# Patient Record
Sex: FEMALE | Race: WHITE | NOT HISPANIC OR LATINO | Employment: FULL TIME | URBAN - METROPOLITAN AREA
[De-identification: names, ages, dates, MRNs, and addresses within clinical notes are randomized per-mention and may not be internally consistent; named-entity substitution may affect disease eponyms.]

---

## 2017-05-01 RX ORDER — IBUPROFEN 600 MG/1
600 TABLET ORAL EVERY 6 HOURS PRN
Qty: 60 TABLET | Refills: 2 | Status: SHIPPED | OUTPATIENT
Start: 2017-05-01 | End: 2018-05-01

## 2017-05-01 NOTE — TELEPHONE ENCOUNTER
"----- Message from Maria Ines Valenzuela sent at 5/1/2017 12:32 PM CDT -----  Contact: [phone]  Patient is requesting to see the  Today for a "flare up from lupus".    "

## 2017-05-01 NOTE — TELEPHONE ENCOUNTER
Pt states that her period started and due to her lupus she is having a lot of cramping. Pt advised that Dr. Wiedemann can send in ibuprofen for her. Pt states that she has taken ibuprofen with no problems. Pt notified that if she is having a problem with lupus she needs to see her rheumatologist. Pt verbalized understanding

## 2017-08-29 ENCOUNTER — OFFICE VISIT (OUTPATIENT)
Dept: OBSTETRICS AND GYNECOLOGY | Facility: CLINIC | Age: 32
End: 2017-08-29
Payer: COMMERCIAL

## 2017-08-29 ENCOUNTER — TELEPHONE (OUTPATIENT)
Dept: OBSTETRICS AND GYNECOLOGY | Facility: CLINIC | Age: 32
End: 2017-08-29

## 2017-08-29 ENCOUNTER — LAB VISIT (OUTPATIENT)
Dept: LAB | Facility: HOSPITAL | Age: 32
End: 2017-08-29
Attending: OBSTETRICS & GYNECOLOGY
Payer: COMMERCIAL

## 2017-08-29 VITALS
HEIGHT: 67 IN | BODY MASS INDEX: 41.62 KG/M2 | DIASTOLIC BLOOD PRESSURE: 68 MMHG | SYSTOLIC BLOOD PRESSURE: 128 MMHG | WEIGHT: 265.19 LBS

## 2017-08-29 DIAGNOSIS — Z12.4 ROUTINE PAPANICOLAOU SMEAR: ICD-10-CM

## 2017-08-29 DIAGNOSIS — Z12.4 ROUTINE PAPANICOLAOU SMEAR: Primary | ICD-10-CM

## 2017-08-29 LAB
ALBUMIN SERPL BCP-MCNC: 3.4 G/DL
ALP SERPL-CCNC: 70 U/L
ALT SERPL W/O P-5'-P-CCNC: 6 U/L
ANION GAP SERPL CALC-SCNC: 8 MMOL/L
AST SERPL-CCNC: 11 U/L
BILIRUB SERPL-MCNC: 0.2 MG/DL
BUN SERPL-MCNC: 8 MG/DL
CALCIUM SERPL-MCNC: 9 MG/DL
CHLORIDE SERPL-SCNC: 106 MMOL/L
CO2 SERPL-SCNC: 25 MMOL/L
CREAT SERPL-MCNC: 0.9 MG/DL
EST. GFR  (AFRICAN AMERICAN): >60 ML/MIN/1.73 M^2
EST. GFR  (NON AFRICAN AMERICAN): >60 ML/MIN/1.73 M^2
GLUCOSE SERPL-MCNC: 86 MG/DL
POTASSIUM SERPL-SCNC: 3.8 MMOL/L
PROT SERPL-MCNC: 6.9 G/DL
SODIUM SERPL-SCNC: 139 MMOL/L
TSH SERPL DL<=0.005 MIU/L-ACNC: 1.08 UIU/ML

## 2017-08-29 PROCEDURE — 84443 ASSAY THYROID STIM HORMONE: CPT

## 2017-08-29 PROCEDURE — 80053 COMPREHEN METABOLIC PANEL: CPT

## 2017-08-29 PROCEDURE — 99395 PREV VISIT EST AGE 18-39: CPT | Mod: S$GLB,,, | Performed by: OBSTETRICS & GYNECOLOGY

## 2017-08-29 PROCEDURE — 99999 PR PBB SHADOW E&M-EST. PATIENT-LVL III: CPT | Mod: PBBFAC,,, | Performed by: OBSTETRICS & GYNECOLOGY

## 2017-08-29 PROCEDURE — 88175 CYTOPATH C/V AUTO FLUID REDO: CPT

## 2017-08-29 PROCEDURE — 36415 COLL VENOUS BLD VENIPUNCTURE: CPT

## 2017-08-29 RX ORDER — AMOXICILLIN 500 MG/1
CAPSULE ORAL
Refills: 0 | COMMUNITY
Start: 2017-07-25 | End: 2017-08-29

## 2017-08-29 RX ORDER — LEVONORGESTREL AND ETHINYL ESTRADIOL 0.15-0.03
1 KIT ORAL DAILY
Qty: 28 TABLET | Refills: 3 | Status: SHIPPED | OUTPATIENT
Start: 2017-08-29 | End: 2022-11-28

## 2017-08-29 RX ORDER — ALPRAZOLAM 1 MG/1
TABLET ORAL
Refills: 1 | COMMUNITY
Start: 2017-08-26 | End: 2020-09-01 | Stop reason: CLARIF

## 2017-08-29 RX ORDER — PHENTERMINE HYDROCHLORIDE 37.5 MG/1
37.5 TABLET ORAL DAILY
Refills: 1 | COMMUNITY
Start: 2017-07-25 | End: 2017-08-29

## 2017-08-29 RX ORDER — CEFUROXIME AXETIL 500 MG/1
TABLET ORAL
Refills: 0 | COMMUNITY
Start: 2017-07-27 | End: 2017-08-29

## 2017-08-29 NOTE — TELEPHONE ENCOUNTER
Tamia, her labs were fine, want her to take the birth control like we discussed, after the second pack, when on 3rd, call us to set up a ultrasound and fu, thanks

## 2017-08-29 NOTE — PROGRESS NOTES
"HPI:   32 y.o.   OB History      Para Term  AB Living    1       1      SAB TAB Ectopic Multiple Live Births    1               Patient's last menstrual period was 2017.    Patient is  here for her annual gynecologic exam.  She has no complaints.     ROS:  GENERAL: No fever, chills, fatigability or weight loss.  SKIN: No rashes, itching or changes in color or texture of skin.  HEAD: No headaches or recent head trauma.  EYES: Visual acuity fine. No photophobia, ocular pain or diplopia.  EARS: Denies ear pain, discharge or vertigo.  NOSE: No loss of smell, no epistaxis or postnasal drip.  MOUTH & THROAT: No hoarseness or change in voice. No excessive gum bleeding.  NODES: Denies swollen glands.  CHEST: Denies ANNE, cyanosis, wheezing, cough and sputum production.  CARDIOVASCULAR: Denies chest pain, PND, orthopnea or reduced exercise tolerance.  ABDOMEN: Appetite fine. No weight loss. Denies diarrhea, abdominal pain, hematemesis or blood in stool.  URINARY: No flank pain, dysuria or hematuria.  PERIPHERAL VASCULAR: No claudication or cyanosis.  MUSCULOSKELETAL: No joint stiffness or swelling. Denies back pain.  NEUROLOGIC: No history of seizures, paralysis, alteration of gait or coordination.    PE:   /68   Ht 5' 7" (1.702 m)   Wt 120.3 kg (265 lb 3.4 oz)   LMP 2017   BMI 41.54 kg/m²   APPEARANCE: Well nourished, well developed, in no acute distress.  NECK: Neck symmetric without masses or thyromegaly.  BREASTS: Symmetrical, no skin changes or visible lesions. No palpable masses, nipple discharge or adenopathy bilaterally.  ABDOMEN: Flat. Soft. No tenderness or masses. No hepatosplenomegaly. No hernias. No CVA tenderness.  VULVA: No lesions. Normal female genitalia.  URETHRAL MEATUS: Normal size and location, no lesions, no prolapse.  URETHRA: No masses, tenderness, prolapse or scarring.  VAGINA: Moist and well rugated, no discharge, no significant cystocele or rectocele.  CERVIX: No " lesions and discharge. PAP done.  UTERUS: Normal size, regular shape, mobile, non-tender, bladder base nontender.  ADNEXA: No masses, tenderness or CDS nodularity.  ANUS PERINEUM: Normal.    PROCEDURES:  Pap smear    Assessment:  Normal Gynecologic Exam    Plan:  Mammogram and Colonoscopy if indicated by current recommendations.  Return to clinic in one year or for any problems or complaints.  No ocp, cycles hx of heavy and cramps, has lupus, irreg , been no steroids  Pain with cycles  Discussed ocp, vs scope  Will need labs, then us,   Dr jen castillo  5  Yrs, endo mild?  Nothing major  Plan, ocp for 2+ cycles, labs, then us when on 2nbd cycle

## 2017-09-05 NOTE — TELEPHONE ENCOUNTER
Informed patient of results. Patient voiced understanding. Notified patient to call office if there were any further questions.   Patient will call to schedule ultrasound and f/u.

## 2017-09-19 ENCOUNTER — TELEPHONE (OUTPATIENT)
Dept: OBSTETRICS AND GYNECOLOGY | Facility: CLINIC | Age: 32
End: 2017-09-19

## 2017-09-19 NOTE — TELEPHONE ENCOUNTER
Pt states she started her period again while taking the birth control. She started taking the birth control on 8/30/17. Pt states her cycle is heavy and she is in a lot of pain. She shouldn't have her cycle until next week.

## 2017-09-19 NOTE — TELEPHONE ENCOUNTER
----- Message from Tona Forbes sent at 9/19/2017  2:48 PM CDT -----  Contact: Self [phone]  Calling to talk to nurse concerning her birth control. Please advice

## 2017-09-20 NOTE — TELEPHONE ENCOUNTER
Stop the pills for this week, allow cycle, and restart a new pack Sunday  This is very common the first 1-3 months of starting a new pill,

## 2020-11-18 ENCOUNTER — OCCUPATIONAL HEALTH (OUTPATIENT)
Dept: URGENT CARE | Facility: CLINIC | Age: 35
End: 2020-11-18

## 2020-11-18 DIAGNOSIS — Z11.9 ENCOUNTER FOR SCREENING EXAMINATION FOR INFECTIOUS DISEASE: Primary | ICD-10-CM

## 2020-11-18 LAB
CTP QC/QA: YES
SARS-COV-2 RDRP RESP QL NAA+PROBE: NEGATIVE

## 2020-11-18 PROCEDURE — 99199 CV19 SPECIMEN HANDLING FEE / SWAB: ICD-10-PCS | Mod: S$GLB,,, | Performed by: PHYSICIAN ASSISTANT

## 2020-11-18 PROCEDURE — 99199 UNLISTED SPECIAL SVC PX/RPRT: CPT | Mod: S$GLB,,, | Performed by: PHYSICIAN ASSISTANT

## 2020-11-18 PROCEDURE — U0002: ICD-10-PCS | Mod: QW,S$GLB,, | Performed by: PHYSICIAN ASSISTANT

## 2020-11-18 PROCEDURE — U0002 COVID-19 LAB TEST NON-CDC: HCPCS | Mod: QW,S$GLB,, | Performed by: PHYSICIAN ASSISTANT

## 2021-01-08 ENCOUNTER — OCCUPATIONAL HEALTH (OUTPATIENT)
Dept: URGENT CARE | Facility: CLINIC | Age: 36
End: 2021-01-08
Payer: MEDICAID

## 2021-01-08 VITALS — TEMPERATURE: 97 F

## 2021-01-08 DIAGNOSIS — U07.1 COVID-19: Primary | ICD-10-CM

## 2021-01-08 LAB
CTP QC/QA: YES
SARS-COV-2 RDRP RESP QL NAA+PROBE: NEGATIVE

## 2021-05-22 PROCEDURE — 99284 EMERGENCY DEPT VISIT MOD MDM: CPT | Mod: 25

## 2021-05-22 PROCEDURE — 96374 THER/PROPH/DIAG INJ IV PUSH: CPT

## 2021-05-22 PROCEDURE — 96375 TX/PRO/DX INJ NEW DRUG ADDON: CPT

## 2021-05-23 ENCOUNTER — HOSPITAL ENCOUNTER (EMERGENCY)
Facility: HOSPITAL | Age: 36
Discharge: HOME | End: 2021-05-23
Attending: EMERGENCY MEDICINE
Payer: MEDICAID

## 2021-05-23 VITALS
HEART RATE: 68 BPM | TEMPERATURE: 98 F | WEIGHT: 230 LBS | SYSTOLIC BLOOD PRESSURE: 128 MMHG | DIASTOLIC BLOOD PRESSURE: 70 MMHG | BODY MASS INDEX: 36.02 KG/M2 | RESPIRATION RATE: 18 BRPM | OXYGEN SATURATION: 100 %

## 2021-05-23 DIAGNOSIS — G43.819 OTHER MIGRAINE WITHOUT STATUS MIGRAINOSUS, INTRACTABLE: Primary | ICD-10-CM

## 2021-05-23 PROCEDURE — 63600175 PHARM REV CODE 636 W HCPCS: Performed by: EMERGENCY MEDICINE

## 2021-05-23 PROCEDURE — 25000003 PHARM REV CODE 250: Performed by: EMERGENCY MEDICINE

## 2021-05-23 RX ORDER — PROCHLORPERAZINE EDISYLATE 5 MG/ML
10 INJECTION INTRAMUSCULAR; INTRAVENOUS
Status: COMPLETED | OUTPATIENT
Start: 2021-05-23 | End: 2021-05-23

## 2021-05-23 RX ORDER — ACETAMINOPHEN 10 MG/ML
1000 INJECTION, SOLUTION INTRAVENOUS
Status: DISCONTINUED | OUTPATIENT
Start: 2021-05-23 | End: 2021-05-23 | Stop reason: HOSPADM

## 2021-05-23 RX ORDER — METOCLOPRAMIDE HYDROCHLORIDE 5 MG/ML
10 INJECTION INTRAMUSCULAR; INTRAVENOUS
Status: COMPLETED | OUTPATIENT
Start: 2021-05-23 | End: 2021-05-23

## 2021-05-23 RX ADMIN — SODIUM CHLORIDE 1000 ML: 0.9 INJECTION, SOLUTION INTRAVENOUS at 01:05

## 2021-05-23 RX ADMIN — PROCHLORPERAZINE EDISYLATE 10 MG: 5 INJECTION INTRAMUSCULAR; INTRAVENOUS at 01:05

## 2021-05-23 RX ADMIN — METOCLOPRAMIDE 10 MG: 5 INJECTION, SOLUTION INTRAMUSCULAR; INTRAVENOUS at 01:05

## 2021-11-03 ENCOUNTER — OFFICE VISIT (OUTPATIENT)
Dept: URGENT CARE | Facility: CLINIC | Age: 36
End: 2021-11-03
Payer: MEDICAID

## 2021-11-03 VITALS
BODY MASS INDEX: 37.67 KG/M2 | OXYGEN SATURATION: 98 % | DIASTOLIC BLOOD PRESSURE: 83 MMHG | TEMPERATURE: 98 F | SYSTOLIC BLOOD PRESSURE: 138 MMHG | WEIGHT: 240 LBS | HEIGHT: 67 IN | HEART RATE: 85 BPM

## 2021-11-03 DIAGNOSIS — J02.9 SORE THROAT: Primary | ICD-10-CM

## 2021-11-03 DIAGNOSIS — J02.0 STREP PHARYNGITIS: ICD-10-CM

## 2021-11-03 DIAGNOSIS — H60.501 ACUTE OTITIS EXTERNA OF RIGHT EAR, UNSPECIFIED TYPE: ICD-10-CM

## 2021-11-03 LAB
CTP QC/QA: YES
MOLECULAR STREP A: POSITIVE

## 2021-11-03 PROCEDURE — 87651 STREP A DNA AMP PROBE: CPT | Mod: QW,S$GLB,,

## 2021-11-03 PROCEDURE — 99203 OFFICE O/P NEW LOW 30 MIN: CPT | Mod: S$GLB,,,

## 2021-11-03 PROCEDURE — 87651 POCT STREP A MOLECULAR: ICD-10-PCS | Mod: QW,S$GLB,,

## 2021-11-03 PROCEDURE — 99203 PR OFFICE/OUTPT VISIT, NEW, LEVL III, 30-44 MIN: ICD-10-PCS | Mod: S$GLB,,,

## 2021-11-03 RX ORDER — AMOXICILLIN AND CLAVULANATE POTASSIUM 875; 125 MG/1; MG/1
1 TABLET, FILM COATED ORAL EVERY 12 HOURS
Qty: 14 TABLET | Refills: 0 | Status: SHIPPED | OUTPATIENT
Start: 2021-11-03 | End: 2021-11-10

## 2021-11-03 RX ORDER — PREDNISONE 20 MG/1
20 TABLET ORAL DAILY
Qty: 3 TABLET | Refills: 0 | Status: SHIPPED | OUTPATIENT
Start: 2021-11-03 | End: 2021-11-06

## 2021-11-03 RX ORDER — OFLOXACIN 3 MG/ML
10 SOLUTION AURICULAR (OTIC) DAILY
Qty: 5 ML | Refills: 0 | Status: SHIPPED | OUTPATIENT
Start: 2021-11-03 | End: 2021-11-10

## 2022-09-27 ENCOUNTER — OFFICE VISIT (OUTPATIENT)
Dept: OBSTETRICS AND GYNECOLOGY | Facility: CLINIC | Age: 37
End: 2022-09-27
Payer: MEDICAID

## 2022-09-27 VITALS
DIASTOLIC BLOOD PRESSURE: 72 MMHG | SYSTOLIC BLOOD PRESSURE: 132 MMHG | BODY MASS INDEX: 39.64 KG/M2 | WEIGHT: 253.06 LBS

## 2022-09-27 DIAGNOSIS — Z01.419 WELL WOMAN EXAM WITH ROUTINE GYNECOLOGICAL EXAM: Primary | ICD-10-CM

## 2022-09-27 PROCEDURE — 1159F MED LIST DOCD IN RCRD: CPT | Mod: CPTII,,, | Performed by: OBSTETRICS & GYNECOLOGY

## 2022-09-27 PROCEDURE — 3075F SYST BP GE 130 - 139MM HG: CPT | Mod: CPTII,,, | Performed by: OBSTETRICS & GYNECOLOGY

## 2022-09-27 PROCEDURE — 99999 PR PBB SHADOW E&M-EST. PATIENT-LVL II: CPT | Mod: PBBFAC,,, | Performed by: OBSTETRICS & GYNECOLOGY

## 2022-09-27 PROCEDURE — 3078F DIAST BP <80 MM HG: CPT | Mod: CPTII,,, | Performed by: OBSTETRICS & GYNECOLOGY

## 2022-09-27 PROCEDURE — 1160F RVW MEDS BY RX/DR IN RCRD: CPT | Mod: CPTII,,, | Performed by: OBSTETRICS & GYNECOLOGY

## 2022-09-27 PROCEDURE — 3075F PR MOST RECENT SYSTOLIC BLOOD PRESS GE 130-139MM HG: ICD-10-PCS | Mod: CPTII,,, | Performed by: OBSTETRICS & GYNECOLOGY

## 2022-09-27 PROCEDURE — 1159F PR MEDICATION LIST DOCUMENTED IN MEDICAL RECORD: ICD-10-PCS | Mod: CPTII,,, | Performed by: OBSTETRICS & GYNECOLOGY

## 2022-09-27 PROCEDURE — 3008F PR BODY MASS INDEX (BMI) DOCUMENTED: ICD-10-PCS | Mod: CPTII,,, | Performed by: OBSTETRICS & GYNECOLOGY

## 2022-09-27 PROCEDURE — 1160F PR REVIEW ALL MEDS BY PRESCRIBER/CLIN PHARMACIST DOCUMENTED: ICD-10-PCS | Mod: CPTII,,, | Performed by: OBSTETRICS & GYNECOLOGY

## 2022-09-27 PROCEDURE — 99385 PREV VISIT NEW AGE 18-39: CPT | Mod: S$PBB,,, | Performed by: OBSTETRICS & GYNECOLOGY

## 2022-09-27 PROCEDURE — 88175 CYTOPATH C/V AUTO FLUID REDO: CPT | Performed by: OBSTETRICS & GYNECOLOGY

## 2022-09-27 PROCEDURE — 99385 PR PREVENTIVE VISIT,NEW,18-39: ICD-10-PCS | Mod: S$PBB,,, | Performed by: OBSTETRICS & GYNECOLOGY

## 2022-09-27 PROCEDURE — 3078F PR MOST RECENT DIASTOLIC BLOOD PRESSURE < 80 MM HG: ICD-10-PCS | Mod: CPTII,,, | Performed by: OBSTETRICS & GYNECOLOGY

## 2022-09-27 PROCEDURE — 99999 PR PBB SHADOW E&M-EST. PATIENT-LVL II: ICD-10-PCS | Mod: PBBFAC,,, | Performed by: OBSTETRICS & GYNECOLOGY

## 2022-09-27 PROCEDURE — 3008F BODY MASS INDEX DOCD: CPT | Mod: CPTII,,, | Performed by: OBSTETRICS & GYNECOLOGY

## 2022-09-27 PROCEDURE — 99212 OFFICE O/P EST SF 10 MIN: CPT | Mod: PBBFAC,PO | Performed by: OBSTETRICS & GYNECOLOGY

## 2022-09-27 NOTE — PROGRESS NOTES
HPI:   37 y.o.   OB History          1    Para        Term                AB   1    Living             SAB   1    IAB        Ectopic        Multiple        Live Births                  Patient's last menstrual period was 2022 (exact date).    Patient is  here for her annual gynecologic exam.  She has no complaints.     ROS:  GENERAL: No fever, chills, fatigability or weight loss.  SKIN: No rashes, itching or changes in color or texture of skin.  HEAD: No headaches or recent head trauma.  EYES: Visual acuity fine. No photophobia, ocular pain or diplopia.  EARS: Denies ear pain, discharge or vertigo.  NOSE: No loss of smell, no epistaxis or postnasal drip.  MOUTH & THROAT: No hoarseness or change in voice. No excessive gum bleeding.  NODES: Denies swollen glands.  CHEST: Denies ANNE, cyanosis, wheezing, cough and sputum production.  CARDIOVASCULAR: Denies chest pain, PND, orthopnea or reduced exercise tolerance.  ABDOMEN: Appetite fine. No weight loss. Denies diarrhea, abdominal pain, hematemesis or blood in stool.  URINARY: No flank pain, dysuria or hematuria.  PERIPHERAL VASCULAR: No claudication or cyanosis.  MUSCULOSKELETAL: No joint stiffness or swelling. Denies back pain.  NEUROLOGIC: No history of seizures, paralysis, alteration of gait or coordination.    PE:   /72   Wt 114.8 kg (253 lb 1.4 oz)   LMP 2022 (Exact Date)   BMI 39.64 kg/m²   APPEARANCE: Well nourished, well developed, in no acute distress.  NECK: Neck symmetric without masses or thyromegaly.  BREASTS: Symmetrical, no skin changes or visible lesions. No palpable masses, nipple discharge or adenopathy bilaterally.  ABDOMEN: Flat. Soft. No tenderness or masses. No hepatosplenomegaly. No hernias. No CVA tenderness.  VULVA: No lesions. Normal female genitalia.  URETHRAL MEATUS: Normal size and location, no lesions, no prolapse.  URETHRA: No masses, tenderness, prolapse or scarring.  VAGINA: Moist and well rugated, no  discharge, no significant cystocele or rectocele.  CERVIX: No lesions and discharge. PAP done.  UTERUS: Normal size, regular shape, mobile, non-tender, bladder base nontender.  ADNEXA: No masses, tenderness or CDS nodularity.  ANUS PERINEUM: Normal.    PROCEDURES:  Pap smear    Assessment:  Normal Gynecologic Exam    Plan:  Mammogram and Colonoscopy if indicated by current recommendations.  Return to clinic in one year or for any problems or complaints.    Infertility  Cycles irreg   getting sa  Lmk  Will rev chart thereafter

## 2022-09-30 LAB
FINAL PATHOLOGIC DIAGNOSIS: NORMAL
Lab: NORMAL

## 2022-10-06 PROBLEM — R06.02 SOB (SHORTNESS OF BREATH): Status: ACTIVE | Noted: 2022-10-06

## 2022-10-12 ENCOUNTER — HOSPITAL ENCOUNTER (EMERGENCY)
Facility: HOSPITAL | Age: 37
Discharge: HOME | End: 2022-10-12
Attending: EMERGENCY MEDICINE
Payer: MEDICAID

## 2022-10-12 VITALS
BODY MASS INDEX: 39.16 KG/M2 | OXYGEN SATURATION: 96 % | SYSTOLIC BLOOD PRESSURE: 122 MMHG | HEART RATE: 74 BPM | TEMPERATURE: 98 F | DIASTOLIC BLOOD PRESSURE: 82 MMHG | WEIGHT: 250 LBS | RESPIRATION RATE: 13 BRPM

## 2022-10-12 DIAGNOSIS — R06.02 SOB (SHORTNESS OF BREATH): ICD-10-CM

## 2022-10-12 DIAGNOSIS — R09.81 NASAL CONGESTION: ICD-10-CM

## 2022-10-12 DIAGNOSIS — J18.9 COMMUNITY ACQUIRED PNEUMONIA OF LEFT LOWER LOBE OF LUNG: ICD-10-CM

## 2022-10-12 DIAGNOSIS — R11.10 VOMITING: Primary | ICD-10-CM

## 2022-10-12 DIAGNOSIS — R09.89 CHEST CONGESTION: ICD-10-CM

## 2022-10-12 LAB
ALBUMIN SERPL BCP-MCNC: 3.6 G/DL (ref 3.5–5.2)
ALP SERPL-CCNC: 48 U/L (ref 55–135)
ALT SERPL W/O P-5'-P-CCNC: 14 U/L (ref 10–44)
ANION GAP SERPL CALC-SCNC: 11 MMOL/L (ref 8–16)
AST SERPL-CCNC: 12 U/L (ref 10–40)
B-HCG UR QL: NEGATIVE
BASOPHILS # BLD AUTO: 0.06 K/UL (ref 0–0.2)
BASOPHILS NFR BLD: 0.7 % (ref 0–1.9)
BILIRUB SERPL-MCNC: 0.2 MG/DL (ref 0.1–1)
BILIRUB UR QL STRIP: NEGATIVE
BUN SERPL-MCNC: 9 MG/DL (ref 6–20)
CALCIUM SERPL-MCNC: 9.5 MG/DL (ref 8.7–10.5)
CHLORIDE SERPL-SCNC: 110 MMOL/L (ref 95–110)
CLARITY UR: CLEAR
CO2 SERPL-SCNC: 21 MMOL/L (ref 23–29)
COLOR UR: COLORLESS
CREAT SERPL-MCNC: 0.7 MG/DL (ref 0.5–1.4)
CTP QC/QA: YES
DIFFERENTIAL METHOD: ABNORMAL
EOSINOPHIL # BLD AUTO: 0.2 K/UL (ref 0–0.5)
EOSINOPHIL NFR BLD: 2.3 % (ref 0–8)
ERYTHROCYTE [DISTWIDTH] IN BLOOD BY AUTOMATED COUNT: 13.5 % (ref 11.5–14.5)
EST. GFR  (NO RACE VARIABLE): >60 ML/MIN/1.73 M^2
GLUCOSE SERPL-MCNC: 128 MG/DL (ref 70–110)
GLUCOSE UR QL STRIP: NEGATIVE
HCT VFR BLD AUTO: 39.4 % (ref 37–48.5)
HGB BLD-MCNC: 13.4 G/DL (ref 12–16)
HGB UR QL STRIP: NEGATIVE
IMM GRANULOCYTES # BLD AUTO: 0.03 K/UL (ref 0–0.04)
IMM GRANULOCYTES NFR BLD AUTO: 0.3 % (ref 0–0.5)
KETONES UR QL STRIP: NEGATIVE
LEUKOCYTE ESTERASE UR QL STRIP: NEGATIVE
LIPASE SERPL-CCNC: 28 U/L (ref 4–60)
LYMPHOCYTES # BLD AUTO: 2.4 K/UL (ref 1–4.8)
LYMPHOCYTES NFR BLD: 27.6 % (ref 18–48)
MCH RBC QN AUTO: 31.2 PG (ref 27–31)
MCHC RBC AUTO-ENTMCNC: 34 G/DL (ref 32–36)
MCV RBC AUTO: 92 FL (ref 82–98)
MONOCYTES # BLD AUTO: 0.5 K/UL (ref 0.3–1)
MONOCYTES NFR BLD: 5.9 % (ref 4–15)
NEUTROPHILS # BLD AUTO: 5.4 K/UL (ref 1.8–7.7)
NEUTROPHILS NFR BLD: 63.2 % (ref 38–73)
NITRITE UR QL STRIP: NEGATIVE
NRBC BLD-RTO: 0 /100 WBC
PH UR STRIP: 7 [PH] (ref 5–8)
PLATELET # BLD AUTO: 306 K/UL (ref 150–450)
PMV BLD AUTO: 9.4 FL (ref 9.2–12.9)
POTASSIUM SERPL-SCNC: 4 MMOL/L (ref 3.5–5.1)
PROT SERPL-MCNC: 6.8 G/DL (ref 6–8.4)
PROT UR QL STRIP: NEGATIVE
RBC # BLD AUTO: 4.3 M/UL (ref 4–5.4)
SODIUM SERPL-SCNC: 142 MMOL/L (ref 136–145)
SP GR UR STRIP: 1.01 (ref 1–1.03)
URN SPEC COLLECT METH UR: ABNORMAL
UROBILINOGEN UR STRIP-ACNC: NEGATIVE EU/DL
WBC # BLD AUTO: 8.59 K/UL (ref 3.9–12.7)

## 2022-10-12 PROCEDURE — 83690 ASSAY OF LIPASE: CPT | Performed by: EMERGENCY MEDICINE

## 2022-10-12 PROCEDURE — 81025 URINE PREGNANCY TEST: CPT | Performed by: EMERGENCY MEDICINE

## 2022-10-12 PROCEDURE — 96374 THER/PROPH/DIAG INJ IV PUSH: CPT

## 2022-10-12 PROCEDURE — 81003 URINALYSIS AUTO W/O SCOPE: CPT | Performed by: EMERGENCY MEDICINE

## 2022-10-12 PROCEDURE — 96361 HYDRATE IV INFUSION ADD-ON: CPT

## 2022-10-12 PROCEDURE — 93005 ELECTROCARDIOGRAM TRACING: CPT

## 2022-10-12 PROCEDURE — 25000003 PHARM REV CODE 250: Performed by: EMERGENCY MEDICINE

## 2022-10-12 PROCEDURE — 85025 COMPLETE CBC W/AUTO DIFF WBC: CPT | Performed by: EMERGENCY MEDICINE

## 2022-10-12 PROCEDURE — 99285 EMERGENCY DEPT VISIT HI MDM: CPT | Mod: 25

## 2022-10-12 PROCEDURE — 93010 EKG 12-LEAD: ICD-10-PCS | Mod: ,,, | Performed by: INTERNAL MEDICINE

## 2022-10-12 PROCEDURE — 63600175 PHARM REV CODE 636 W HCPCS: Performed by: EMERGENCY MEDICINE

## 2022-10-12 PROCEDURE — 80053 COMPREHEN METABOLIC PANEL: CPT | Performed by: EMERGENCY MEDICINE

## 2022-10-12 PROCEDURE — 93010 ELECTROCARDIOGRAM REPORT: CPT | Mod: ,,, | Performed by: INTERNAL MEDICINE

## 2022-10-12 RX ORDER — ONDANSETRON 2 MG/ML
4 INJECTION INTRAMUSCULAR; INTRAVENOUS
Status: COMPLETED | OUTPATIENT
Start: 2022-10-12 | End: 2022-10-12

## 2022-10-12 RX ORDER — DOXYCYCLINE 100 MG/1
100 CAPSULE ORAL 2 TIMES DAILY
Qty: 10 CAPSULE | Refills: 0 | Status: SHIPPED | OUTPATIENT
Start: 2022-10-12 | End: 2022-10-17

## 2022-10-12 RX ORDER — ONDANSETRON 4 MG/1
4 TABLET, FILM COATED ORAL EVERY 6 HOURS
Qty: 12 TABLET | Refills: 0 | Status: SHIPPED | OUTPATIENT
Start: 2022-10-12 | End: 2022-11-28

## 2022-10-12 RX ORDER — ONDANSETRON 4 MG/1
4 TABLET, FILM COATED ORAL EVERY 6 HOURS
Qty: 12 TABLET | Refills: 0 | Status: SHIPPED | OUTPATIENT
Start: 2022-10-12 | End: 2022-10-12 | Stop reason: SDUPTHER

## 2022-10-12 RX ADMIN — SODIUM CHLORIDE 1000 ML: 0.9 INJECTION, SOLUTION INTRAVENOUS at 02:10

## 2022-10-12 RX ADMIN — ONDANSETRON 4 MG: 2 INJECTION INTRAMUSCULAR; INTRAVENOUS at 02:10

## 2022-10-12 NOTE — ED PROVIDER NOTES
Call Reason: scheduling appointment  Visit Type: Consult  When appointment should be scheduled:  Next available  Provider: Epilepsy provider  Appointment Note:     Encounter Date: 10/12/2022       History     Chief Complaint   Patient presents with    Chest Congestion     Pt with history of lupus has had chest and nasal congestion for about 3 months. Pt also has weakness, fever , nausea, vomiting and diarrhea. Pt has seen Pulmonology and urgent care and has been sent to ER for further eval .      37-year-old female with history of lupus (not on medication), asthma, migraine, presents to the ED for 3 months of congestion.  Patient reports that she had nasal and chest congestion for the last 3 months, has been seen by pulmonologist and multiple visits to urgent care without improvement.  Patient reports to have increased ear pressure, nausea, vomiting, diarrhea, and fever for the last 3 days.  States that her cough getting worse, now she saw some blood streaks from cough.  Patient has been you multiple OTC medications without help, has been using albuterol and Flovent for asthma.  Patient quit smoking for 1 week now.  Patient lost follow-up with her rheumatologist due to insurance issue.  She stopped taking her Plaquenil because of the side effects. No chest pain, shortness of breath, or abdominal pain.       Review of patient's allergies indicates:   Allergen Reactions    Toradol [ketorolac] Swelling     Past Medical History:   Diagnosis Date    Asthma     Lupus     Migraine headache      Past Surgical History:   Procedure Laterality Date    TYMPANOSTOMY TUBE PLACEMENT       Family History   Problem Relation Age of Onset    Hypertension Mother     Hypertension Father     Hyperlipidemia Father     Heart disease Father     Diabetes Mellitus Father      Social History     Tobacco Use    Smoking status: Some Days     Types: Cigarettes     Last attempt to quit: 2017     Years since quittin.1    Smokeless tobacco: Never   Substance Use Topics    Alcohol use: No    Drug use: No     Review of Systems   Constitutional:  Negative for chills and fever.   HENT:  Positive for  congestion. Negative for sore throat.    Eyes:  Negative for pain and visual disturbance.   Respiratory:  Positive for wheezing. Negative for cough and chest tightness.    Cardiovascular:  Negative for chest pain and palpitations.   Gastrointestinal:  Positive for diarrhea, nausea and vomiting. Negative for abdominal pain.   Genitourinary:  Negative for dysuria and flank pain.   Musculoskeletal:  Negative for back pain and neck pain.   Skin:  Positive for rash.   Neurological:  Negative for numbness and headaches.     Physical Exam     Initial Vitals [10/12/22 1303]   BP Pulse Resp Temp SpO2   132/86 100 20 98.3 °F (36.8 °C) 98 %      MAP       --         Physical Exam    Nursing note and vitals reviewed.  Constitutional: She appears well-developed. No distress.   HENT:   Head: Normocephalic and atraumatic.   Mouth/Throat: Oropharynx is clear and moist.   Eyes: Conjunctivae and EOM are normal. Pupils are equal, round, and reactive to light.   Neck: No JVD present.   Normal range of motion.  Cardiovascular:  Normal rate, regular rhythm, normal heart sounds and intact distal pulses.           Pulmonary/Chest: Breath sounds normal. No respiratory distress.   Abdominal: Abdomen is soft. She exhibits no distension. There is no abdominal tenderness.   Musculoskeletal:         General: No tenderness or edema. Normal range of motion.      Cervical back: Normal range of motion.     Neurological: She is alert and oriented to person, place, and time. She has normal strength.   Skin: Skin is warm and dry. Capillary refill takes less than 2 seconds. Rash noted.   Erythema rash on her chest.   No malar rash, or discoid        ED Course   Procedures  Labs Reviewed   CBC W/ AUTO DIFFERENTIAL - Abnormal; Notable for the following components:       Result Value    MCH 31.2 (*)     All other components within normal limits   COMPREHENSIVE METABOLIC PANEL - Abnormal; Notable for the following components:    CO2 21 (*)     Glucose 128  (*)     Alkaline Phosphatase 48 (*)     All other components within normal limits   URINALYSIS, REFLEX TO URINE CULTURE - Abnormal; Notable for the following components:    Color, UA Colorless (*)     All other components within normal limits    Narrative:     Specimen Source->Urine   LIPASE   POCT URINE PREGNANCY     EKG Readings: (Independently Interpreted)   Initial Reading: No STEMI. Previous EKG: Compared with most recent EKG Rhythm: Normal Sinus Rhythm. Heart Rate: 75. Ectopy: No Ectopy. Conduction: Normal. ST Segments: Normal ST Segments. T Waves: Normal. Axis: Normal. Clinical Impression: Normal Sinus Rhythm   ECG Results              EKG 12-lead (In process)  Result time 10/12/22 15:11:30      In process by Interface, Lab In [facility] (10/12/22 15:11:30)                   Narrative:    Test Reason : R11.10,    Vent. Rate : 075 BPM     Atrial Rate : 075 BPM     P-R Int : 178 ms          QRS Dur : 076 ms      QT Int : 376 ms       P-R-T Axes : 041 082 054 degrees     QTc Int : 419 ms    Normal sinus rhythm  Normal ECG  No previous ECGs available    Referred By: [email or web address]   SELF           Confirmed By:                                   Imaging Results              X-Ray Chest PA And Lateral (Final result)  Result time 10/12/22 14:47:35      Final result by Alonso Croft MD (10/12/22 14:47:35)                   Impression:      1. Increased parenchymal attenuation projected over the left lower lung zone, suggests atelectasis however developing consolidation is a consideration, correlation is needed.      Electronically signed by: Alonso Croft MD  Date:    10/12/2022  Time:    14:47               Narrative:    EXAMINATION:  XR CHEST PA AND LATERAL    CLINICAL HISTORY:  Other specified symptoms and signs involving the circulatory and respiratory systems    TECHNIQUE:  PA and lateral views of the chest were performed.    COMPARISON:  08/31/2022    FINDINGS:  The cardiomediastinal silhouette is not enlarged.   There is no pleural effusion.  The trachea is midline.  The lungs are symmetrically expanded bilaterally with bandlike atelectasis projected over the left lower lung zone..  No large focal consolidation seen.  There is no pneumothorax.  The osseous structures are remarkable for degenerative change/remote traumatic change of the left acromioclavicular joint..                                    X-Rays:   Independently Interpreted Readings:   Chest X-Ray: Normal heart size. There is an infiltrate in the LLL.   Medications   ondansetron injection 4 mg (4 mg Intravenous Given 10/12/22 1415)   sodium chloride 0.9% bolus 1,000 mL (0 mLs Intravenous Stopped 10/12/22 1516)     Medical Decision Making:   History:   Old Medical Records: I decided to obtain old medical records.  Initial Assessment:   37-year-old female with history of lupus (not on medication), asthma, migraine, presents to the ED for 3 months of congestion, recent nausea, vomiting, and diarrhea. Patient is well appearing, no fever, bilateral wheezing on exam, no respiratory distress.   Differential Diagnosis:   Pneumonia, gastroenteritis, pancreatitis, lupus flare, UTI, asthma exacerbation.   Clinical Tests:   Lab Tests: Ordered and Reviewed  Radiological Study: Ordered and Reviewed  Medical Tests: Ordered and Reviewed           ED Course as of 10/12/22 1718   Wed Oct 12, 2022   1652 WBC: 8.59 [NC]   1714 Hemoglobin: 13.4 [NC]   1714 Platelets: 306 [NC]   1714 Sodium: 142 [NC]   1714 Potassium: 4.0 [NC]   1714 BUN: 9 [NC]   1714 Creatinine: 0.7 [NC]   1714 Lipase: 28 [NC]   1714 Urinalysis, Reflex to Urine Culture Urine, Clean Catch(!)  No UTI [NC]   1714 Patient is currently quitting smoking, on Flovent for her asthma, has steroid a month ago.  Doubt to have asthma attack this point.  Her chest x-ray concerning for consolidation over the lower lobe, will treat her as community-acquired pneumonia.  Will refer to Rheumatology for glucose follow-up, and ENT for  her chronic nasal congestion.  Prescribed Zofran, doxycycline.  Recommended to follow-up with her primary care provider within 1 week, provided discharge instruction and return to the ED function.  No other question.  He [NC]      ED Course User Index  [NC] Guy Ramos MD                 Clinical Impression:   Final diagnoses:  [R11.10] Vomiting (Primary)  [R09.89] Chest congestion  [J18.9] Community acquired pneumonia of left lower lobe of lung  [R09.81] Nasal congestion  [R06.02] SOB (shortness of breath)        ED Disposition Condition    Discharge Stable          ED Prescriptions       Medication Sig Dispense Start Date End Date Auth. Provider    doxycycline (VIBRAMYCIN) 100 MG Cap Take 1 capsule (100 mg total) by mouth 2 (two) times daily. for 5 days 10 capsule 10/12/2022 10/17/2022 Guy Ramos MD    ondansetron (ZOFRAN) 4 MG tablet  (Status: Discontinued) Take 1 tablet (4 mg total) by mouth every 6 (six) hours. 12 tablet 10/12/2022 10/12/2022 Guy Ramos MD    ondansetron (ZOFRAN) 4 MG tablet Take 1 tablet (4 mg total) by mouth every 6 (six) hours. 12 tablet 10/12/2022 -- uGy Ramos MD          Follow-up Information       Follow up With Specialties Details Why Contact Info Additional Information    [facility] Rheumatology Rheumatology Schedule an appointment as soon as possible for a visit   [address] [facility] [address] [phone] Please park in surface lot and check in at the .    [facility] ENT Otolaryngology Schedule an appointment as soon as possible for a visit   [address]  [facility] [address]  [phone]     Primary Care Provider  In 1 week       [facility] Emergency Dept Emergency Medicine  As needed [address] [facility] [id number] [phone]              Guy Ramos MD  Resident  10/12/22 [id number]

## 2022-10-12 NOTE — ED NOTES
Turkey sandwich, orange juice, and chocolate pudding provided to patient. Patient sitting @ edge of bed eating snacks,  @ bedside. VSS.

## 2022-10-12 NOTE — ED NOTES
Patient presents to ED from home with c/o N/V, diarrhea, fever, weakness, and chest congestion x3 months. Patient states she has a hx of lupus. Patient alert and oriented, and ambulatory @ baseline. Patient has been seen by pulmonology and urgent care and has been sent to ED for further eval.

## 2022-11-28 ENCOUNTER — OFFICE VISIT (OUTPATIENT)
Dept: URGENT CARE | Facility: CLINIC | Age: 37
End: 2022-11-28
Payer: MEDICAID

## 2022-11-28 VITALS
SYSTOLIC BLOOD PRESSURE: 154 MMHG | RESPIRATION RATE: 20 BRPM | OXYGEN SATURATION: 98 % | TEMPERATURE: 98 F | BODY MASS INDEX: 39.24 KG/M2 | HEART RATE: 86 BPM | WEIGHT: 250 LBS | HEIGHT: 67 IN | DIASTOLIC BLOOD PRESSURE: 72 MMHG

## 2022-11-28 DIAGNOSIS — R50.9 FEVER, UNSPECIFIED FEVER CAUSE: ICD-10-CM

## 2022-11-28 DIAGNOSIS — R11.0 NAUSEA: ICD-10-CM

## 2022-11-28 DIAGNOSIS — Z86.19 HISTORY OF CANDIDAL VULVOVAGINITIS: ICD-10-CM

## 2022-11-28 DIAGNOSIS — J02.9 EXUDATIVE PHARYNGITIS: Primary | ICD-10-CM

## 2022-11-28 DIAGNOSIS — J02.9 SORE THROAT: ICD-10-CM

## 2022-11-28 LAB
CTP QC/QA: YES
MOLECULAR STREP A: NEGATIVE
POC MOLECULAR INFLUENZA A AGN: NEGATIVE
POC MOLECULAR INFLUENZA B AGN: NEGATIVE
SARS-COV-2 RDRP RESP QL NAA+PROBE: NEGATIVE

## 2022-11-28 PROCEDURE — 87635 SARS-COV-2 COVID-19 AMP PRB: CPT | Mod: QW,S$GLB,, | Performed by: NURSE PRACTITIONER

## 2022-11-28 PROCEDURE — 1160F RVW MEDS BY RX/DR IN RCRD: CPT | Mod: CPTII,S$GLB,, | Performed by: NURSE PRACTITIONER

## 2022-11-28 PROCEDURE — 1159F PR MEDICATION LIST DOCUMENTED IN MEDICAL RECORD: ICD-10-PCS | Mod: CPTII,S$GLB,, | Performed by: NURSE PRACTITIONER

## 2022-11-28 PROCEDURE — 87502 INFLUENZA DNA AMP PROBE: CPT | Mod: QW,S$GLB,, | Performed by: NURSE PRACTITIONER

## 2022-11-28 PROCEDURE — 3077F SYST BP >= 140 MM HG: CPT | Mod: CPTII,S$GLB,, | Performed by: NURSE PRACTITIONER

## 2022-11-28 PROCEDURE — 3077F PR MOST RECENT SYSTOLIC BLOOD PRESSURE >= 140 MM HG: ICD-10-PCS | Mod: CPTII,S$GLB,, | Performed by: NURSE PRACTITIONER

## 2022-11-28 PROCEDURE — 3078F PR MOST RECENT DIASTOLIC BLOOD PRESSURE < 80 MM HG: ICD-10-PCS | Mod: CPTII,S$GLB,, | Performed by: NURSE PRACTITIONER

## 2022-11-28 PROCEDURE — 87502 POCT INFLUENZA A/B MOLECULAR: ICD-10-PCS | Mod: QW,S$GLB,, | Performed by: NURSE PRACTITIONER

## 2022-11-28 PROCEDURE — 3078F DIAST BP <80 MM HG: CPT | Mod: CPTII,S$GLB,, | Performed by: NURSE PRACTITIONER

## 2022-11-28 PROCEDURE — 3008F BODY MASS INDEX DOCD: CPT | Mod: CPTII,S$GLB,, | Performed by: NURSE PRACTITIONER

## 2022-11-28 PROCEDURE — 99213 OFFICE O/P EST LOW 20 MIN: CPT | Mod: S$GLB,,, | Performed by: NURSE PRACTITIONER

## 2022-11-28 PROCEDURE — 87635: ICD-10-PCS | Mod: QW,S$GLB,, | Performed by: NURSE PRACTITIONER

## 2022-11-28 PROCEDURE — 99213 PR OFFICE/OUTPT VISIT, EST, LEVL III, 20-29 MIN: ICD-10-PCS | Mod: S$GLB,,, | Performed by: NURSE PRACTITIONER

## 2022-11-28 PROCEDURE — 1160F PR REVIEW ALL MEDS BY PRESCRIBER/CLIN PHARMACIST DOCUMENTED: ICD-10-PCS | Mod: CPTII,S$GLB,, | Performed by: NURSE PRACTITIONER

## 2022-11-28 PROCEDURE — 1159F MED LIST DOCD IN RCRD: CPT | Mod: CPTII,S$GLB,, | Performed by: NURSE PRACTITIONER

## 2022-11-28 PROCEDURE — 87651 POCT STREP A MOLECULAR: ICD-10-PCS | Mod: QW,S$GLB,, | Performed by: NURSE PRACTITIONER

## 2022-11-28 PROCEDURE — 3008F PR BODY MASS INDEX (BMI) DOCUMENTED: ICD-10-PCS | Mod: CPTII,S$GLB,, | Performed by: NURSE PRACTITIONER

## 2022-11-28 PROCEDURE — 87651 STREP A DNA AMP PROBE: CPT | Mod: QW,S$GLB,, | Performed by: NURSE PRACTITIONER

## 2022-11-28 RX ORDER — ONDANSETRON 4 MG/1
4 TABLET, ORALLY DISINTEGRATING ORAL EVERY 8 HOURS PRN
Qty: 12 TABLET | Refills: 0 | Status: SHIPPED | OUTPATIENT
Start: 2022-11-28 | End: 2023-02-06 | Stop reason: SDUPTHER

## 2022-11-28 RX ORDER — FLUCONAZOLE 150 MG/1
150 TABLET ORAL
Qty: 2 TABLET | Refills: 0 | Status: SHIPPED | OUTPATIENT
Start: 2022-11-28 | End: 2022-12-02

## 2022-11-28 RX ORDER — AZITHROMYCIN 250 MG/1
TABLET, FILM COATED ORAL
Qty: 6 TABLET | Refills: 0 | Status: SHIPPED | OUTPATIENT
Start: 2022-11-28 | End: 2023-02-03 | Stop reason: SDUPTHER

## 2022-11-28 RX ORDER — PREDNISONE 20 MG/1
20 TABLET ORAL DAILY
Qty: 3 TABLET | Refills: 0 | Status: SHIPPED | OUTPATIENT
Start: 2022-11-28 | End: 2022-12-01

## 2022-11-28 NOTE — PROGRESS NOTES
"Subjective:       Patient ID: Jenn Lance is a 37 y.o. female.    Vitals:  height is 5' 7" (1.702 m) and weight is 113.4 kg (250 lb). Her temperature is 98.1 °F (36.7 °C). Her blood pressure is 154/72 (abnormal) and her pulse is 86. Her respiration is 20 and oxygen saturation is 98%.     Chief Complaint: Sore Throat (Nausea, earaches )    37 year old patient presents sore throat/ earaches/headaches. Patient stated symptoms started Friday.  Provider note begins below    Patient reports swelling and posterior oropharynx.  Reports a history of lupus.  She states weird things can happen when she has a lupus flare.  She feels like she may be having a flare.  She has a appointment pending for rheumatologist.    Sore Throat   This is a new problem. The current episode started in the past 7 days. The problem has been gradually worsening. The maximum temperature recorded prior to her arrival was 101 - 101.9 F. The pain is at a severity of 5/10. Pertinent negatives include no coughing, diarrhea, shortness of breath or vomiting. She has tried cool liquids for the symptoms. The treatment provided mild relief.     Constitution: Positive for fatigue and fever.   HENT:  Positive for sore throat.    Cardiovascular:  Negative for chest pain and sob on exertion.   Respiratory:  Negative for cough and shortness of breath.    Gastrointestinal:  Negative for nausea, vomiting, constipation and diarrhea.     Objective:      Physical Exam   Constitutional: She is oriented to person, place, and time.   HENT:   Head: Normocephalic and atraumatic.   Nose: No rhinorrhea or congestion.   Mouth/Throat: Oropharyngeal exudate and posterior oropharyngeal erythema present.   Cardiovascular: Normal rate, regular rhythm and normal heart sounds.   Pulmonary/Chest: Effort normal and breath sounds normal. No respiratory distress.   Abdominal: Normal appearance.   Neurological: She is alert and oriented to person, place, and time.   Skin: Skin is " warm and dry.   Psychiatric: Her behavior is normal. Mood normal.         Results for orders placed or performed in visit on 11/28/22   POCT Strep A, Molecular   Result Value Ref Range    Molecular Strep A, POC Negative Negative     Acceptable Yes    POCT Influenza A/B MOLECULAR   Result Value Ref Range    POC Molecular Influenza A Ag Negative Negative, Not Reported    POC Molecular Influenza B Ag Negative Negative, Not Reported     Acceptable Yes    POCT COVID-19 Rapid Screening   Result Value Ref Range    POC Rapid COVID Negative Negative     Acceptable Yes        Assessment:       1. Exudative pharyngitis    2. Sore throat    3. Fever, unspecified fever cause    4. Nausea    5. History of candidal vulvovaginitis          Plan:       Strep and flu test negative  COVID test negative  Antibiotics, magic mouthwash, and short course of steroids   Follow-up if symptoms worsen or do not improve.  Patient states she gets yeast infections with antibiotics.  Will send in Diflucan.    Nausea medication.        Exudative pharyngitis  -     azithromycin (ZITHROMAX Z-TRISTEN) 250 MG tablet; Take 2 tablets (500 mg) on  Day 1,  followed by 1 tablet (250 mg) once daily on Days 2 through 5.  Dispense: 6 tablet; Refill: 0  -     predniSONE (DELTASONE) 20 MG tablet; Take 1 tablet (20 mg total) by mouth once daily. for 3 days  Dispense: 3 tablet; Refill: 0  -     (Magic mouthwash) 1:1:1 diphenhydrAMINE(Benadryl) 12.5mg/5ml liq, aluminum & magnesium hydroxide-simethicone (Maalox), LIDOcaine viscous 2%; Swish and spit 10 mLs every 4 (four) hours as needed (sore throat).  Dispense: 200 mL; Refill: 0    Sore throat  -     POCT Strep A, Molecular  -     POCT Influenza A/B MOLECULAR  -     POCT COVID-19 Rapid Screening    Fever, unspecified fever cause  -     POCT COVID-19 Rapid Screening    Nausea  -     ondansetron (ZOFRAN-ODT) 4 MG TbDL; Take 1 tablet (4 mg total) by mouth every 8 (eight) hours  as needed (nausea).  Dispense: 12 tablet; Refill: 0    History of candidal vulvovaginitis  -     fluconazole (DIFLUCAN) 150 MG Tab; Take 1 tablet (150 mg total) by mouth Every 3 (three) days. for 2 doses  Dispense: 2 tablet; Refill: 0

## 2022-12-05 PROBLEM — J45.40 MODERATE PERSISTENT ASTHMA WITHOUT COMPLICATION: Status: ACTIVE | Noted: 2022-12-05

## 2023-02-06 PROBLEM — J45.901 EXACERBATION OF ASTHMA: Status: RESOLVED | Noted: 2023-02-06 | Resolved: 2023-02-06

## 2023-02-06 PROBLEM — J45.901 EXACERBATION OF ASTHMA: Status: ACTIVE | Noted: 2023-02-06

## 2023-02-06 PROBLEM — J45.51 SEVERE PERSISTENT ASTHMA WITH ACUTE EXACERBATION: Status: ACTIVE | Noted: 2022-12-05

## 2023-02-15 ENCOUNTER — OFFICE VISIT (OUTPATIENT)
Dept: OTOLARYNGOLOGY | Facility: CLINIC | Age: 38
End: 2023-02-15
Payer: MEDICAID

## 2023-02-15 ENCOUNTER — CLINICAL SUPPORT (OUTPATIENT)
Dept: AUDIOLOGY | Facility: CLINIC | Age: 38
End: 2023-02-15
Payer: MEDICAID

## 2023-02-15 VITALS — DIASTOLIC BLOOD PRESSURE: 75 MMHG | SYSTOLIC BLOOD PRESSURE: 118 MMHG | HEART RATE: 90 BPM

## 2023-02-15 DIAGNOSIS — R42 DIZZINESS AND GIDDINESS: ICD-10-CM

## 2023-02-15 DIAGNOSIS — Z01.10 NORMAL HEARING TEST: Primary | ICD-10-CM

## 2023-02-15 DIAGNOSIS — H61.91 LESION OF EXTERNAL EAR CANAL, RIGHT: ICD-10-CM

## 2023-02-15 DIAGNOSIS — H66.004 RECURRENT ACUTE SUPPURATIVE OTITIS MEDIA OF RIGHT EAR WITHOUT SPONTANEOUS RUPTURE OF TYMPANIC MEMBRANE: ICD-10-CM

## 2023-02-15 DIAGNOSIS — Z86.69 HISTORY OF EAR INFECTIONS: ICD-10-CM

## 2023-02-15 DIAGNOSIS — Z96.22 HISTORY OF PLACEMENT OF EAR TUBES: ICD-10-CM

## 2023-02-15 DIAGNOSIS — H93.293 ABNORMAL AUDITORY PERCEPTION OF BOTH EARS: ICD-10-CM

## 2023-02-15 DIAGNOSIS — H93.13 TINNITUS, BILATERAL: Primary | ICD-10-CM

## 2023-02-15 PROCEDURE — 99203 OFFICE O/P NEW LOW 30 MIN: CPT | Mod: S$PBB,,, | Performed by: NURSE PRACTITIONER

## 2023-02-15 PROCEDURE — 3074F SYST BP LT 130 MM HG: CPT | Mod: CPTII,,, | Performed by: NURSE PRACTITIONER

## 2023-02-15 PROCEDURE — 1159F MED LIST DOCD IN RCRD: CPT | Mod: CPTII,,, | Performed by: NURSE PRACTITIONER

## 2023-02-15 PROCEDURE — 99999 PR PBB SHADOW E&M-EST. PATIENT-LVL III: ICD-10-PCS | Mod: PBBFAC,,, | Performed by: NURSE PRACTITIONER

## 2023-02-15 PROCEDURE — 99999 PR PBB SHADOW E&M-EST. PATIENT-LVL III: CPT | Mod: PBBFAC,,, | Performed by: NURSE PRACTITIONER

## 2023-02-15 PROCEDURE — 92557 COMPREHENSIVE HEARING TEST: CPT | Mod: PBBFAC

## 2023-02-15 PROCEDURE — 3074F PR MOST RECENT SYSTOLIC BLOOD PRESSURE < 130 MM HG: ICD-10-PCS | Mod: CPTII,,, | Performed by: NURSE PRACTITIONER

## 2023-02-15 PROCEDURE — 1159F PR MEDICATION LIST DOCUMENTED IN MEDICAL RECORD: ICD-10-PCS | Mod: CPTII,,, | Performed by: NURSE PRACTITIONER

## 2023-02-15 PROCEDURE — 3078F PR MOST RECENT DIASTOLIC BLOOD PRESSURE < 80 MM HG: ICD-10-PCS | Mod: CPTII,,, | Performed by: NURSE PRACTITIONER

## 2023-02-15 PROCEDURE — 99203 PR OFFICE/OUTPT VISIT, NEW, LEVL III, 30-44 MIN: ICD-10-PCS | Mod: S$PBB,,, | Performed by: NURSE PRACTITIONER

## 2023-02-15 PROCEDURE — 3078F DIAST BP <80 MM HG: CPT | Mod: CPTII,,, | Performed by: NURSE PRACTITIONER

## 2023-02-15 PROCEDURE — 99213 OFFICE O/P EST LOW 20 MIN: CPT | Mod: PBBFAC | Performed by: NURSE PRACTITIONER

## 2023-02-15 PROCEDURE — 92567 TYMPANOMETRY: CPT | Mod: PBBFAC

## 2023-02-15 RX ORDER — HYDROCORTISONE AND ACETIC ACID 20.75; 10.375 MG/ML; MG/ML
4 SOLUTION AURICULAR (OTIC) 2 TIMES DAILY
Qty: 10 ML | Refills: 0 | Status: SHIPPED | OUTPATIENT
Start: 2023-02-15 | End: 2023-02-20

## 2023-02-15 NOTE — PROGRESS NOTES
Jenn Lance, a 38 y.o. female, was seen today in the clinic for an audiologic evaluation.  The patient's main complaint was hearing loss.  Mrs. Lance reported having difficulty understanding speech in most situations. She also reported experiencing bothersome tinnitus in both ears as well as aural fullness. Mrs. Lance occasionally experiences otalgia in her right ear, and has a history of chronic middle ear infections since childhood. She also has episodes of vertigo that occur when she stands up or moves too quickly.     Tympanometry revealed Type A in the right ear and Type A in the left ear. Audiogram results revealed normal hearing sensitivity in the right and left ears. Speech reception thresholds were noted at 5 dB in the right ear and 10 dB in the left ear.  Speech discrimination scores were 100% in the right ear and 100% in the left ear.    Recommendations:  Otologic evaluation  Annual audiogram or sooner if needed  Hearing protection when in noise

## 2023-02-15 NOTE — PROGRESS NOTES
Subjective:   Jenn Lance is a 38 y.o. female who was referred to me by Dr. Guy Ramos in consultation for hearing loss and recurrent otitis media.    Jenn Lance presents to clinic with complaints of hearing loss and recent ear infection of right ear. She went to [facility] 12 days ago and was dx'd with recurrent otitis media and was given Augmentin and Prednisone. She reports improvement in aural fullness, otorrhea, and otalgia of right ear since then. Patient states she has always felt that she cannot hear very well. She states she has a harder time hearing people when they talk in a low volume. She is not able to perceive a difference between hearing in left vs right ear. There is a prior history of ear surgery(tubes as a child). There is a prior history of ear infections. PMH positive for asthma and allergic rhinitis. There is not a history of ear trauma. There is not a family history of hearing loss at a young age. She denies a history of significant noise exposure. She does not wear hearing aids currently. She had a hearing test today in clinic.      Past Medical History  She has a past medical history of Asthma, Lupus, and Migraine headache.    Past Surgical History  She has a past surgical history that includes Tympanostomy tube placement.    Family History  Her family history includes Diabetes Mellitus in her father; Heart disease in her father; Hyperlipidemia in her father; Hypertension in her father and mother.    Social History  She reports that she has been smoking cigarettes. She has never used smokeless tobacco. She reports that she does not drink alcohol and does not use drugs.    Allergies  She is allergic to toradol [ketorolac].    Medications  She has a current medication list which includes the following prescription(s): acetic acid-hydrocortisone, proventil hfa, albuterol, amoxicillin-clavulanate 875-125mg, azithromycin, ergocalciferol, advair hfa, ondansetron, pantoprazole,  prednisone, prednisone, and spiriva with handihaler.  Review of Systems     Constitutional: Negative for chills and fever.      HENT: Positive for ear discharge, ear infection, ear pain, hearing loss, ringing in the ears, sinus infection, sinus pressure and stuffy nose.  Negative for facial swelling, sore throat, trouble swallowing and voice change.      Eyes:  Negative for change in eyesight and eye drainage.     Respiratory:  Negative for shortness of breath.      Cardiovascular:  Negative for chest pain.     Allergy: Positive for seasonal allergies.     Neurological: Negative for headaches.        Objective:   /75   Pulse 90   LMP 01/17/2023      Constitutional:   She is oriented to person, place, and time. Vital signs are normal. She appears well-developed. She appears alert. Normal speech.      Head:  Normocephalic and atraumatic.     Ears:    Right Ear: No drainage, swelling or tenderness. No foreign bodies. Tympanic membrane is not perforated, not erythematous, not retracted and not bulging. No middle ear effusion.   Left Ear: No drainage, swelling or tenderness. No foreign bodies. Tympanic membrane is not perforated, not erythematous, not retracted and not bulging.  No middle ear effusion.   Right ear- raised papule in external ear canal  Left ear- normal    Nose:  No rhinorrhea.     Neck:  Neck normal without thyromegaly masses, asymmetry, normal tracheal structure, crepitus, and tenderness, trachea normal, phonation normal and no adenopathy.     Pulmonary/Chest:   Effort normal.     Psychiatric:   She has a normal mood and affect. Her speech is normal and behavior is normal.     Neurological:   She is alert and oriented to person, place, and time. She has neurological normal, alert and oriented.   Procedure  None    Data Reviewed  WBC (K/uL)   Date Value   02/06/2023 15.63 (H)     Eosinophil % (%)   Date Value   02/06/2023 0.0     Eos # (K/uL)   Date Value   02/06/2023 0.0     Platelets (K/uL)    Date Value   02/06/2023 318     Glucose (mg/dL)   Date Value   02/06/2023 117 (H)     IgE (IU/mL)   Date Value   10/06/2022 43     Imaging  No pertinent imaging available.    Audiogram      I independently reviewed the tracings of the complete audiometric evaluation performed today.  I reviewed the audiogram with the patient as well.  Pertinent findings include a normal study.  Assessment:     1. Normal hearing test    2. Recurrent acute suppurative otitis media of right ear without spontaneous rupture of tympanic membrane    3. History of ear infections    4. History of placement of ear tubes    5. Lesion of external ear canal, right      Plan:   Normal hearing test  Reviewed patient's audiometric testing interpretation which is consistent with normal hearing bilaterally.  Hearing conservation strongly recommended when in noisy environments.  Patient encouraged to return to clinic every 2 years for audiometric testing.     Recurrent acute suppurative otitis media of right ear without spontaneous rupture of tympanic membrane  Resolved. Ear exam WNL and no evidence of infection confirmed by tympanogram. Finish antibiotic course. Contact clinic if symptoms persist, worsen, or return.    History of ear infections  History of placement of ear tubes

## 2023-03-07 ENCOUNTER — HOSPITAL ENCOUNTER (EMERGENCY)
Facility: HOSPITAL | Age: 38
Discharge: HOME | End: 2023-03-07
Attending: EMERGENCY MEDICINE
Payer: MEDICAID

## 2023-03-07 VITALS
TEMPERATURE: 99 F | BODY MASS INDEX: 39.24 KG/M2 | WEIGHT: 250 LBS | SYSTOLIC BLOOD PRESSURE: 139 MMHG | DIASTOLIC BLOOD PRESSURE: 70 MMHG | OXYGEN SATURATION: 99 % | HEIGHT: 67 IN | RESPIRATION RATE: 18 BRPM | HEART RATE: 94 BPM
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
## 2023-03-07 DIAGNOSIS — S16.1XXA CERVICAL STRAIN, ACUTE, INITIAL ENCOUNTER: Primary | ICD-10-CM

## 2023-03-07 DIAGNOSIS — S29.019A THORACIC MYOFASCIAL STRAIN, INITIAL ENCOUNTER: ICD-10-CM

## 2023-03-07 LAB
B-HCG UR QL: NEGATIVE
CTP QC/QA: YES

## 2023-03-07 PROCEDURE — 81025 URINE PREGNANCY TEST: CPT | Mod: ER | Performed by: EMERGENCY MEDICINE

## 2023-03-07 PROCEDURE — 99283 EMERGENCY DEPT VISIT LOW MDM: CPT | Mod: ER

## 2023-03-07 RX ORDER — METHOCARBAMOL 750 MG/1
750 TABLET, FILM COATED ORAL 3 TIMES DAILY
Qty: 30 TABLET | Refills: 0 | Status: SHIPPED | OUTPATIENT
Start: 2023-03-07 | End: 2023-03-17

## 2023-03-07 NOTE — Clinical Note
"Jenn Nieto"Tramaine Lance was seen and treated in our emergency department on 3/7/2023.  She may return to work on 03/09/2023.       If you have any questions or concerns, please don't hesitate to call.      OVIDIO Samaniego"
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
## 2023-03-08 NOTE — ED NOTES
LOC: The patient is awake, alert and aware of environment with an appropriate affect, the patient is oriented x 3 and speaking appropriately.     Psych: Patient is calm and cooperative with good eye contact.    APPEARANCE: Patient is clean and non toxic appearing    NEUROLOGIC:  LINA, Follows commands without difficulty. Speech is clear. No neuro deficits observed.    HEENT: Denies HEENT complaint or injury, moist mucus membranes     RESPIRATORY: Airway is open and patent, respirations are spontaneous; patient has a normal effort and rate. Bilateral breath sounds are clear. Pink nailbeds.     CARDIAC: Patient has a normal rate and rhythm, no peripheral edema noted, capillary refill < 2 seconds.     GI/ : no distention noted.     MUSCULOSKELETAL:  Normal range of motion noted. Moves all extremities well, No swelling, deformity or tenderness noted.    SKIN: The skin is warm, dry and intact. Patient has normal skin turgor and moist mucus membranes, no rashes or lesions. No Breakdown noted.

## 2023-03-08 NOTE — DISCHARGE INSTRUCTIONS
Follow up with your primary care or rheumatologist for further evaluation and treatment and possible physical therapy orders.  Return to the emergency department for severe pain, numbness, weakness or if worse in any way
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
## 2023-04-10 PROBLEM — J45.50 SEVERE PERSISTENT ASTHMA WITHOUT COMPLICATION: Status: ACTIVE | Noted: 2022-12-05

## 2023-05-16 ENCOUNTER — OFFICE VISIT (OUTPATIENT)
Dept: OTOLARYNGOLOGY | Facility: CLINIC | Age: 38
End: 2023-05-16
Payer: MEDICAID

## 2023-05-16 VITALS
WEIGHT: 271.38 LBS | HEIGHT: 67 IN | BODY MASS INDEX: 42.6 KG/M2 | HEART RATE: 80 BPM | DIASTOLIC BLOOD PRESSURE: 71 MMHG | SYSTOLIC BLOOD PRESSURE: 101 MMHG

## 2023-05-16 DIAGNOSIS — J32.9 CHRONIC SINUSITIS, UNSPECIFIED LOCATION: Primary | ICD-10-CM

## 2023-05-16 DIAGNOSIS — J34.2 NASAL SEPTAL DEVIATION: ICD-10-CM

## 2023-05-16 DIAGNOSIS — J34.3 HYPERTROPHY OF BOTH INFERIOR NASAL TURBINATES: ICD-10-CM

## 2023-05-16 DIAGNOSIS — J45.909 ASTHMA, UNSPECIFIED ASTHMA SEVERITY, UNSPECIFIED WHETHER COMPLICATED, UNSPECIFIED WHETHER PERSISTENT: ICD-10-CM

## 2023-05-16 PROCEDURE — 1160F PR REVIEW ALL MEDS BY PRESCRIBER/CLIN PHARMACIST DOCUMENTED: ICD-10-PCS | Mod: CPTII,,, | Performed by: STUDENT IN AN ORGANIZED HEALTH CARE EDUCATION/TRAINING PROGRAM

## 2023-05-16 PROCEDURE — 31231 PR NASAL ENDOSCOPY, DX: ICD-10-PCS | Mod: S$PBB,,, | Performed by: STUDENT IN AN ORGANIZED HEALTH CARE EDUCATION/TRAINING PROGRAM

## 2023-05-16 PROCEDURE — 31231 NASAL ENDOSCOPY DX: CPT | Mod: S$PBB,,, | Performed by: STUDENT IN AN ORGANIZED HEALTH CARE EDUCATION/TRAINING PROGRAM

## 2023-05-16 PROCEDURE — 3008F BODY MASS INDEX DOCD: CPT | Mod: CPTII,,, | Performed by: STUDENT IN AN ORGANIZED HEALTH CARE EDUCATION/TRAINING PROGRAM

## 2023-05-16 PROCEDURE — 3074F SYST BP LT 130 MM HG: CPT | Mod: CPTII,,, | Performed by: STUDENT IN AN ORGANIZED HEALTH CARE EDUCATION/TRAINING PROGRAM

## 2023-05-16 PROCEDURE — 3008F PR BODY MASS INDEX (BMI) DOCUMENTED: ICD-10-PCS | Mod: CPTII,,, | Performed by: STUDENT IN AN ORGANIZED HEALTH CARE EDUCATION/TRAINING PROGRAM

## 2023-05-16 PROCEDURE — 99214 OFFICE O/P EST MOD 30 MIN: CPT | Mod: 25,S$PBB,, | Performed by: STUDENT IN AN ORGANIZED HEALTH CARE EDUCATION/TRAINING PROGRAM

## 2023-05-16 PROCEDURE — 1160F RVW MEDS BY RX/DR IN RCRD: CPT | Mod: CPTII,,, | Performed by: STUDENT IN AN ORGANIZED HEALTH CARE EDUCATION/TRAINING PROGRAM

## 2023-05-16 PROCEDURE — 1159F PR MEDICATION LIST DOCUMENTED IN MEDICAL RECORD: ICD-10-PCS | Mod: CPTII,,, | Performed by: STUDENT IN AN ORGANIZED HEALTH CARE EDUCATION/TRAINING PROGRAM

## 2023-05-16 PROCEDURE — 3078F DIAST BP <80 MM HG: CPT | Mod: CPTII,,, | Performed by: STUDENT IN AN ORGANIZED HEALTH CARE EDUCATION/TRAINING PROGRAM

## 2023-05-16 PROCEDURE — 1159F MED LIST DOCD IN RCRD: CPT | Mod: CPTII,,, | Performed by: STUDENT IN AN ORGANIZED HEALTH CARE EDUCATION/TRAINING PROGRAM

## 2023-05-16 PROCEDURE — 99999 PR PBB SHADOW E&M-EST. PATIENT-LVL IV: ICD-10-PCS | Mod: PBBFAC,,, | Performed by: STUDENT IN AN ORGANIZED HEALTH CARE EDUCATION/TRAINING PROGRAM

## 2023-05-16 PROCEDURE — 99999 PR PBB SHADOW E&M-EST. PATIENT-LVL IV: CPT | Mod: PBBFAC,,, | Performed by: STUDENT IN AN ORGANIZED HEALTH CARE EDUCATION/TRAINING PROGRAM

## 2023-05-16 PROCEDURE — 99214 PR OFFICE/OUTPT VISIT, EST, LEVL IV, 30-39 MIN: ICD-10-PCS | Mod: 25,S$PBB,, | Performed by: STUDENT IN AN ORGANIZED HEALTH CARE EDUCATION/TRAINING PROGRAM

## 2023-05-16 PROCEDURE — 31231 NASAL ENDOSCOPY DX: CPT | Mod: PBBFAC | Performed by: STUDENT IN AN ORGANIZED HEALTH CARE EDUCATION/TRAINING PROGRAM

## 2023-05-16 PROCEDURE — 99214 OFFICE O/P EST MOD 30 MIN: CPT | Mod: PBBFAC,25 | Performed by: STUDENT IN AN ORGANIZED HEALTH CARE EDUCATION/TRAINING PROGRAM

## 2023-05-16 PROCEDURE — 3074F PR MOST RECENT SYSTOLIC BLOOD PRESSURE < 130 MM HG: ICD-10-PCS | Mod: CPTII,,, | Performed by: STUDENT IN AN ORGANIZED HEALTH CARE EDUCATION/TRAINING PROGRAM

## 2023-05-16 PROCEDURE — 3078F PR MOST RECENT DIASTOLIC BLOOD PRESSURE < 80 MM HG: ICD-10-PCS | Mod: CPTII,,, | Performed by: STUDENT IN AN ORGANIZED HEALTH CARE EDUCATION/TRAINING PROGRAM

## 2023-05-16 NOTE — PROGRESS NOTES
Otolaryngology - Head and Neck Surgery New Patient Visit    5/16/2023    Referring Provider: Self, Aaareferral    No chief complaint on file.    History of Present Illness, Otolaryngology Specialty-Specific Exam, and Assessment and Plan:     Jenn Lance is a 38 y.o. female who presents for evaluation of recurrent sinus infections, which has been present for over 30 years. She complains of recurrent episodes of strep throat that cause sore throat, ear aches, wheezing, nasal congestion, purulent nasal drainage, and occasional epistaxis. She denies anosmia, previous nasal trauma, or visual changes. She has been treated with sinus irrigations and flonase in the past. Every time she gets sick she will get steroid shot and course of antibiotics, last was azithromycin and Augmentin. Gets sick up to 5-6 times a year. She has had allergy testing done in the past and was told she is allergic to red dye and grass . She denies previous skullbase surgery. She denies snoring.     She has a history of asthma and is on advair & albuterol. She also has PMH of lupus, not on any current medications due to switch in her insurance.     She had a CT of the sinuses done on 9/22/16 which I reviewed along with the associated radiology report.    On exam today, the ears are normal. The oral cavity is clear. The neck is clear. The nasopharynx, hypopharynx, and larynx are normal. Nasal endoscopy reveals left septal deviation with spur. Hypertrophic inferior turbinates bilaterally. No nasal masses or nasal polyposis. No purulent drainage in the middle meatus. Nasopharynx clear without lesions. Eustachian tubes WNL.     Impression today is chronic recurrent sinusitis.    Will draw labs for immune workup. Dedicated CT of her sinuses.     Given findings (examination, sinonasal endoscopy, and/or imaging performed and reviewed) and her history related to these sinonasal issues that have been refractory to current medical management, I feel  "escalation of medical management is indicated at this time. I have specifically recommended budesonide irrigations for stronger topical steroid therapy. This is intended to both improve overall management and symptom control, and to reduce potential need for systemic steroids and the associated adverse effects and risks associated with recurrent systemic steroid therapy. I counseled her on the off-label nature of this particular use/delivery of budesonide, and she consented to use.     -Specific instructions regarding the use of budesonide nasal irrigations were provided, including review of use of standard nasal saline irrigations and the addition of budesonide to these nasal saline irrigations. Instructions were also provided on obtaining this medication and a prescription was sent in for the patient (to [facility]) so that home delivery/mail order can be arranged. I have authorized once daily dosing if financial issues prevent twice daily use (recommended), as well as options for alternative steroids to be used should the advised budesonide be prohibitively expensive. All questions regarding this were answered, and I encouraged her to call for any additional questions, concerns, or if there were any issues with obtaining the budesonide for use in irrigations.        Thank you for allowing us to participate in the care of your patient. We will continue to keep you informed of her progress.    Sincerely yours,    Bennie Gilbert MD      Objective     Physical Examination  Vitals -  height is 5' 7" (1.702 m) and weight is 123.1 kg (271 lb 6.2 oz). Her blood pressure is 101/71 and her pulse is 80.   Constitutional - General appearance: Normal. Ability to communicate: Normal.  Head & Face - Overall appearance, scars, masses: Normal. Palpation &/or percussion of face: Normal. Salivary glands: Normal. Facial strength: Normal  ENMT - Otoscopic exam: Normal. Assessment of hearing: Normal. External " "inspection: Normal. Nasal mucosa, septum, turbinates: Abnormal see exam details. Lips, teeth, gums: Normal. Oropharynx: Normal. Pharyngeal walls/pyriform sinus: Normal. Larynx: Normal. Nasopharynx: Normal  Neck - Neck: Normal. Thyroid: Normal  Lymphatic - Palpation of lymph nodes: Normal  Eyes - Ocular mobility: Normal  Respiratory - Inspection of Chest: Normal  Cardiovascular - Peripheral vascular system: Normal  Neurological/Psychiatric - Orientation: Normal    Review of Systems  Review of Systems   Constitutional:  Positive for malaise/fatigue.   HENT:  Positive for ear discharge, ear pain and sore throat.    Eyes:  Positive for photophobia.   Respiratory:  Positive for cough and wheezing.    Cardiovascular: Negative.    Gastrointestinal:  Positive for constipation, diarrhea, heartburn and vomiting.   Musculoskeletal:  Positive for back pain.   Skin:  Positive for rash.   Neurological:  Positive for dizziness and headaches.   Psychiatric/Behavioral:  The patient is nervous/anxious.       Answers submitted by the patient for this visit:  Review of Symptoms Questionnaire  (Submitted on 5/16/2023)  Ear infection(s)?: Yes  sinus pressure : Yes  Sinus infection(s)?: Yes  mouth sores: Yes  Tooth/Dental Problems?: Yes  trouble swallowing: Yes  Eye Drainage?: Yes  Snoring?: Yes  Difficulty urinating?: Yes  Muscle aches / pain?: Yes  Food Allergies?: Yes  Seasonal Allergies?: Yes  Cold all of the time? : Yes  Light-headedness: Yes  swollen glands: Yes    A complete review of systems was obtained 05/17/2023 and reviewed.  The review of systems is negative for symptoms except as described above.    /71 (BP Location: Left arm, Patient Position: Sitting, BP Method: Large (Automatic))   Pulse 80   Ht 5' 7" (1.702 m)   Wt 123.1 kg (271 lb 6.2 oz)   BMI 42.51 kg/m²      Nasal Endoscopy:  5/16/2023    The use of diagnostic nasal endoscopy was considered medically necessary for the evaluation and visualization of the " nasal anatomy for symptoms suggestive of nasal or sinus origin. Physical examination (including a nasal speculum evaluation) did not provide sufficient clinical information to establish a diagnosis, or symptoms did not improve or worsened following treatment.     The nasal cavity was decongested with topical 1% phenylephrine and anesthetized with 4% lidocaine.  A rigid 0-degree endoscope was introduced into the nasal cavity.    The patient was seated in the examination chair. After discussion of risks and benefits, a nasal endoscope was inserted into the nose the endoscope was passed along the left nasal floor to the nasopharynx. It was then passed between the middle and superior meatus, nasal turbinates, nasal septum, nasopharynx and sphenoethmoid region. The nasal endoscope was withdrawn and there was no complications. An identical procedure was performed on the right side. I was present for the entire procedure.The patient tolerated the above procedure well. The findings of this procedure can be found in the dictated note from 5/16/2023 visit.                                Data Reviewed    WBC (K/uL)   Date Value   02/06/2023 15.63 (H)     Eosinophil % (%)   Date Value   02/06/2023 0.0     Eos # (K/uL)   Date Value   02/06/2023 0.0     Platelets (K/uL)   Date Value   02/06/2023 318     Glucose (mg/dL)   Date Value   02/06/2023 117 (H)     IgE (IU/mL)   Date Value   10/06/2022 43     I independently reviewed the images of the CT head dated 9/22/16. Pertinent findings include left septal deviation. No significant sinonasal opacification. Hypoplastic left frontal sinus. Hypertrophic inferior turbinates.

## 2023-05-17 ENCOUNTER — PATIENT MESSAGE (OUTPATIENT)
Dept: OTOLARYNGOLOGY | Facility: CLINIC | Age: 38
End: 2023-05-17
Payer: MEDICAID

## 2023-05-18 ENCOUNTER — LAB VISIT (OUTPATIENT)
Dept: LAB | Facility: HOSPITAL | Age: 38
End: 2023-05-18
Attending: STUDENT IN AN ORGANIZED HEALTH CARE EDUCATION/TRAINING PROGRAM
Payer: MEDICAID

## 2023-05-18 DIAGNOSIS — J32.9 CHRONIC SINUSITIS, UNSPECIFIED LOCATION: ICD-10-CM

## 2023-05-18 PROCEDURE — 36415 COLL VENOUS BLD VENIPUNCTURE: CPT | Performed by: STUDENT IN AN ORGANIZED HEALTH CARE EDUCATION/TRAINING PROGRAM

## 2023-05-18 PROCEDURE — 86648 DIPHTHERIA ANTIBODY: CPT | Performed by: STUDENT IN AN ORGANIZED HEALTH CARE EDUCATION/TRAINING PROGRAM

## 2023-05-19 ENCOUNTER — HOSPITAL ENCOUNTER (OUTPATIENT)
Dept: RADIOLOGY | Facility: HOSPITAL | Age: 38
Discharge: HOME | End: 2023-05-19
Attending: STUDENT IN AN ORGANIZED HEALTH CARE EDUCATION/TRAINING PROGRAM
Payer: MEDICAID

## 2023-05-19 DIAGNOSIS — J32.9 CHRONIC SINUSITIS, UNSPECIFIED LOCATION: ICD-10-CM

## 2023-05-19 PROCEDURE — 70486 CT MAXILLOFACIAL W/O DYE: CPT | Mod: TC

## 2023-05-19 PROCEDURE — 70486 CT MEDTRONIC SINUSES WITHOUT: ICD-10-PCS | Mod: 26,,, | Performed by: RADIOLOGY

## 2023-05-19 PROCEDURE — 70486 CT MAXILLOFACIAL W/O DYE: CPT | Mod: 26,,, | Performed by: RADIOLOGY

## 2023-05-30 LAB
C DIPHTHERIAE AB SER IA-ACNC: 0.1 IU/ML
C TETANI TOXOID AB SER-ACNC: 1.48 IU/ML
DEPRECATED S PNEUM23 IGG SER-MCNC: <0.3 UG/ML
DEPRECATED S PNEUM4 IGG SER-MCNC: <0.3 UG/ML
HAEM INFLU B IGG SER IA-MCNC: 7.37 MCG/ML
S PN DA SERO 19F IGG SER-MCNC: 1.4 UG/ML
S PNEUM DA 1 IGG SER-MCNC: <0.3 UG/ML
S PNEUM DA 14 IGG SER-MCNC: <0.3
S PNEUM DA 18C IGG SER-MCNC: <0.3
S PNEUM DA 19A IGG SER-MCNC: 3 UG/ML
S PNEUM DA 3 IGG SER-MCNC: <0.3 UG/ML
S PNEUM DA 5 IGG SER-MCNC: 0.9 UG/ML
S PNEUM DA 6A IGG SER-MCNC: 0.3 UG/ML
S PNEUM DA 6B IGG SER-MCNC: 0.5 UG/ML
S PNEUM DA 7F IGG SER-MCNC: 0.5 UG/ML
S PNEUM DA 9V IGG SER-MCNC: <0.3 UG/ML

## 2023-07-31 DIAGNOSIS — R05.3 CHRONIC COUGH: Primary | ICD-10-CM

## 2023-07-31 DIAGNOSIS — R10.11 RIGHT UPPER QUADRANT PAIN: Primary | ICD-10-CM

## 2023-08-01 ENCOUNTER — HOSPITAL ENCOUNTER (OUTPATIENT)
Dept: RADIOLOGY | Facility: HOSPITAL | Age: 38
Discharge: HOME | End: 2023-08-01
Attending: NURSE PRACTITIONER
Payer: MEDICAID

## 2023-08-01 DIAGNOSIS — R05.3 CHRONIC COUGH: ICD-10-CM

## 2023-08-01 PROCEDURE — 71046 X-RAY EXAM CHEST 2 VIEWS: CPT | Mod: TC,FY,PO

## 2023-08-01 PROCEDURE — 71046 X-RAY EXAM CHEST 2 VIEWS: CPT | Mod: 26,,, | Performed by: RADIOLOGY

## 2023-08-01 PROCEDURE — 71046 XR CHEST PA AND LATERAL: ICD-10-PCS | Mod: 26,,, | Performed by: RADIOLOGY

## 2023-08-15 ENCOUNTER — OFFICE VISIT (OUTPATIENT)
Dept: OTOLARYNGOLOGY | Facility: CLINIC | Age: 38
End: 2023-08-15
Payer: MEDICAID

## 2023-08-15 VITALS — WEIGHT: 272 LBS | BODY MASS INDEX: 42.6 KG/M2

## 2023-08-15 DIAGNOSIS — J34.2 NASAL SEPTAL DEVIATION: ICD-10-CM

## 2023-08-15 DIAGNOSIS — J32.9 CHRONIC SINUSITIS, UNSPECIFIED LOCATION: Primary | ICD-10-CM

## 2023-08-15 DIAGNOSIS — J34.3 HYPERTROPHY OF BOTH INFERIOR NASAL TURBINATES: ICD-10-CM

## 2023-08-15 PROCEDURE — 99999 PR PBB SHADOW E&M-EST. PATIENT-LVL III: CPT | Mod: PBBFAC,,, | Performed by: STUDENT IN AN ORGANIZED HEALTH CARE EDUCATION/TRAINING PROGRAM

## 2023-08-15 PROCEDURE — 1159F MED LIST DOCD IN RCRD: CPT | Mod: CPTII,,, | Performed by: STUDENT IN AN ORGANIZED HEALTH CARE EDUCATION/TRAINING PROGRAM

## 2023-08-15 PROCEDURE — 99214 OFFICE O/P EST MOD 30 MIN: CPT | Mod: 25,S$PBB,, | Performed by: STUDENT IN AN ORGANIZED HEALTH CARE EDUCATION/TRAINING PROGRAM

## 2023-08-15 PROCEDURE — 87070 CULTURE OTHR SPECIMN AEROBIC: CPT | Performed by: STUDENT IN AN ORGANIZED HEALTH CARE EDUCATION/TRAINING PROGRAM

## 2023-08-15 PROCEDURE — 99214 PR OFFICE/OUTPT VISIT, EST, LEVL IV, 30-39 MIN: ICD-10-PCS | Mod: 25,S$PBB,, | Performed by: STUDENT IN AN ORGANIZED HEALTH CARE EDUCATION/TRAINING PROGRAM

## 2023-08-15 PROCEDURE — 1160F RVW MEDS BY RX/DR IN RCRD: CPT | Mod: CPTII,,, | Performed by: STUDENT IN AN ORGANIZED HEALTH CARE EDUCATION/TRAINING PROGRAM

## 2023-08-15 PROCEDURE — 99213 OFFICE O/P EST LOW 20 MIN: CPT | Mod: PBBFAC,25 | Performed by: STUDENT IN AN ORGANIZED HEALTH CARE EDUCATION/TRAINING PROGRAM

## 2023-08-15 PROCEDURE — 31231 PR NASAL ENDOSCOPY, DX: ICD-10-PCS | Mod: S$PBB,,, | Performed by: STUDENT IN AN ORGANIZED HEALTH CARE EDUCATION/TRAINING PROGRAM

## 2023-08-15 PROCEDURE — 31231 NASAL ENDOSCOPY DX: CPT | Mod: S$PBB,,, | Performed by: STUDENT IN AN ORGANIZED HEALTH CARE EDUCATION/TRAINING PROGRAM

## 2023-08-15 PROCEDURE — 1159F PR MEDICATION LIST DOCUMENTED IN MEDICAL RECORD: ICD-10-PCS | Mod: CPTII,,, | Performed by: STUDENT IN AN ORGANIZED HEALTH CARE EDUCATION/TRAINING PROGRAM

## 2023-08-15 PROCEDURE — 99999 PR PBB SHADOW E&M-EST. PATIENT-LVL III: ICD-10-PCS | Mod: PBBFAC,,, | Performed by: STUDENT IN AN ORGANIZED HEALTH CARE EDUCATION/TRAINING PROGRAM

## 2023-08-15 PROCEDURE — 87075 CULTR BACTERIA EXCEPT BLOOD: CPT | Performed by: STUDENT IN AN ORGANIZED HEALTH CARE EDUCATION/TRAINING PROGRAM

## 2023-08-15 PROCEDURE — 31231 NASAL ENDOSCOPY DX: CPT | Mod: PBBFAC | Performed by: STUDENT IN AN ORGANIZED HEALTH CARE EDUCATION/TRAINING PROGRAM

## 2023-08-15 PROCEDURE — 3008F BODY MASS INDEX DOCD: CPT | Mod: CPTII,,, | Performed by: STUDENT IN AN ORGANIZED HEALTH CARE EDUCATION/TRAINING PROGRAM

## 2023-08-15 PROCEDURE — 3008F PR BODY MASS INDEX (BMI) DOCUMENTED: ICD-10-PCS | Mod: CPTII,,, | Performed by: STUDENT IN AN ORGANIZED HEALTH CARE EDUCATION/TRAINING PROGRAM

## 2023-08-15 PROCEDURE — 1160F PR REVIEW ALL MEDS BY PRESCRIBER/CLIN PHARMACIST DOCUMENTED: ICD-10-PCS | Mod: CPTII,,, | Performed by: STUDENT IN AN ORGANIZED HEALTH CARE EDUCATION/TRAINING PROGRAM

## 2023-08-15 RX ORDER — DOXYCYCLINE HYCLATE 100 MG
100 TABLET ORAL 2 TIMES DAILY
Qty: 20 TABLET | Refills: 0 | Status: SHIPPED | OUTPATIENT
Start: 2023-08-15 | End: 2023-08-25

## 2023-08-15 RX ORDER — DOXYCYCLINE HYCLATE 100 MG
100 TABLET ORAL 2 TIMES DAILY
Qty: 20 TABLET | Refills: 0 | Status: SHIPPED | OUTPATIENT
Start: 2023-08-15 | End: 2023-08-15

## 2023-08-15 NOTE — PROGRESS NOTES
Subjective:      Jenn Nieto is a 38 y.o. female who comes for follow-up of sinusitis.  Her last visit with me was on 5/16/2023.  Immune workup showed low titers to strep. She reports increased nasal congestion, purulent nasal drainage over the last 2 weeks. Reports facial pressure and head aches.     Her current sinus regime consists of: Budesonide irrigations.     The patient's medications, allergies, past medical, surgical, social and family histories were reviewed and updated as appropriate.    A detailed review of systems was obtained with pertinent positives as per the above HPI, and otherwise negative.    Objective:     Wt 123.4 kg (272 lb)   BMI 42.60 kg/m²        Constitutional:   Vital signs are normal. She appears well-developed. Normal speech.      Head:  Normocephalic and atraumatic.     Ears:    Right Ear: No drainage or tenderness. No middle ear effusion.   Left Ear: No drainage or tenderness.  No middle ear effusion.     Mouth/Throat  Oropharynx clear and moist without lesions or asymmetry and normal uvula midline. No trismus. No oropharyngeal exudate. Mirror exam not performed due to patient tolerance.  Mirror exam not performed due to patient tolerance.      Neck:  Neck normal without thyromegaly masses, asymmetry, normal tracheal structure, crepitus, and tenderness, thyroid normal and trachea normal.     Pulmonary/Chest:   Effort normal.     Psychiatric:   She has a normal mood and affect. Her speech is normal.     Skin:   No abrasions, lacerations, lesions, or rashes.       Procedure    Nasal endoscopy performed.  See procedure note.    Nasal Endoscopy:  8/15/2023    The use of diagnostic nasal endoscopy was considered medically necessary for the evaluation and visualization of the nasal anatomy for symptoms suggestive of nasal or sinus origin. Physical examination (including a nasal speculum evaluation) did not provide sufficient clinical information to establish a diagnosis, or symptoms did  not improve or worsened following treatment.     The nasal cavity was decongested with topical 1% phenylephrine and anesthetized with 4% lidocaine.  A rigid 0-degree endoscope was introduced into the nasal cavity.    The patient was seated in the examination chair. After discussion of risks and benefits, a nasal endoscope was inserted into the nose the endoscope was passed along the left nasal floor to the nasopharynx. It was then passed between the middle and superior meatus, nasal turbinates, nasal septum, nasopharynx and sphenoethmoid region. The nasal endoscope was withdrawn and there was no complications. An identical procedure was performed on the right side. I was present for the entire procedure.The patient tolerated the above procedure well. The findings of this procedure can be found in the dictated note from 8/15/2023 visit.                          Edema and purulent nasal drainage in the middle meatus bilaterally.     Data Reviewed    WBC (K/uL)   Date Value   02/06/2023 15.63 (H)     Eosinophil % (%)   Date Value   02/06/2023 0.0     Eos # (K/uL)   Date Value   02/06/2023 0.0     Platelets (K/uL)   Date Value   02/06/2023 318     Glucose (mg/dL)   Date Value   02/06/2023 117 (H)     IgE (IU/mL)   Date Value   10/06/2022 43     S.pneumoniae Type 1  <0.3    S.pneumoniae Type 3  <0.3    Strep pneumo Type 4  <0.3    S.pneumoniae Type 5  0.9    Serotype 6 (6A)  0.3    Strep pneumo Type 14  <0.3    S.pneumoniae Type 19F  1.4    S.pneumoniae Type 23F  <0.3    S.pneumoniae Type 6B  0.5    S.pneumoniae Type 7F  0.5    Serotype 56 (18C)  <0.3    Serotype 57 (19A)  3.0    S.pneumoniae Type 9V Abs  <0.3        I independently reviewed the images of the CT sinuses dated 5/19/23. Pertinent findings include s-shaped septal deviation with right spur. Narrowed OMCs bilaterally. Left gilda bullosa. R>L frontal sinus pneumatization. No significant sinonasal opacification.       Assessment:     1. Chronic sinusitis,  unspecified location    2. Hypertrophy of both inferior nasal turbinates    3. Nasal septal deviation         Plan:     - Pneumovax booster   - Doxy  - Cultures of the sinuses were obtained today, if the antibiotics need to be changed bases based on the sensitivities I will adjust them as needed.    - Cont budesonide    Bennie Gilbert MD

## 2023-08-16 ENCOUNTER — PATIENT MESSAGE (OUTPATIENT)
Dept: OTOLARYNGOLOGY | Facility: CLINIC | Age: 38
End: 2023-08-16
Payer: MEDICAID

## 2023-08-18 LAB — BACTERIA SPEC AEROBE CULT: NORMAL

## 2023-08-21 LAB — BACTERIA SPEC ANAEROBE CULT: NORMAL

## 2023-10-27 ENCOUNTER — OFFICE VISIT (OUTPATIENT)
Dept: OTOLARYNGOLOGY | Facility: CLINIC | Age: 38
End: 2023-10-27
Payer: MEDICAID

## 2023-10-27 ENCOUNTER — PATIENT MESSAGE (OUTPATIENT)
Dept: OTOLARYNGOLOGY | Facility: CLINIC | Age: 38
End: 2023-10-27

## 2023-10-27 VITALS
BODY MASS INDEX: 42.63 KG/M2 | WEIGHT: 271.63 LBS | HEART RATE: 79 BPM | SYSTOLIC BLOOD PRESSURE: 130 MMHG | HEIGHT: 67 IN | DIASTOLIC BLOOD PRESSURE: 84 MMHG

## 2023-10-27 DIAGNOSIS — J34.3 HYPERTROPHY OF BOTH INFERIOR NASAL TURBINATES: ICD-10-CM

## 2023-10-27 DIAGNOSIS — J34.2 NASAL SEPTAL DEVIATION: ICD-10-CM

## 2023-10-27 DIAGNOSIS — J32.9 CHRONIC SINUSITIS, UNSPECIFIED LOCATION: Primary | ICD-10-CM

## 2023-10-27 PROCEDURE — 3008F PR BODY MASS INDEX (BMI) DOCUMENTED: ICD-10-PCS | Mod: CPTII,,, | Performed by: STUDENT IN AN ORGANIZED HEALTH CARE EDUCATION/TRAINING PROGRAM

## 2023-10-27 PROCEDURE — 99999 PR PBB SHADOW E&M-EST. PATIENT-LVL III: CPT | Mod: PBBFAC,,, | Performed by: STUDENT IN AN ORGANIZED HEALTH CARE EDUCATION/TRAINING PROGRAM

## 2023-10-27 PROCEDURE — 1159F MED LIST DOCD IN RCRD: CPT | Mod: CPTII,,, | Performed by: STUDENT IN AN ORGANIZED HEALTH CARE EDUCATION/TRAINING PROGRAM

## 2023-10-27 PROCEDURE — 99999 PR PBB SHADOW E&M-EST. PATIENT-LVL III: ICD-10-PCS | Mod: PBBFAC,,, | Performed by: STUDENT IN AN ORGANIZED HEALTH CARE EDUCATION/TRAINING PROGRAM

## 2023-10-27 PROCEDURE — 99213 OFFICE O/P EST LOW 20 MIN: CPT | Mod: PBBFAC | Performed by: STUDENT IN AN ORGANIZED HEALTH CARE EDUCATION/TRAINING PROGRAM

## 2023-10-27 PROCEDURE — 3008F BODY MASS INDEX DOCD: CPT | Mod: CPTII,,, | Performed by: STUDENT IN AN ORGANIZED HEALTH CARE EDUCATION/TRAINING PROGRAM

## 2023-10-27 PROCEDURE — 99213 OFFICE O/P EST LOW 20 MIN: CPT | Mod: S$PBB,,, | Performed by: STUDENT IN AN ORGANIZED HEALTH CARE EDUCATION/TRAINING PROGRAM

## 2023-10-27 PROCEDURE — 1159F PR MEDICATION LIST DOCUMENTED IN MEDICAL RECORD: ICD-10-PCS | Mod: CPTII,,, | Performed by: STUDENT IN AN ORGANIZED HEALTH CARE EDUCATION/TRAINING PROGRAM

## 2023-10-27 PROCEDURE — 99213 PR OFFICE/OUTPT VISIT, EST, LEVL III, 20-29 MIN: ICD-10-PCS | Mod: S$PBB,,, | Performed by: STUDENT IN AN ORGANIZED HEALTH CARE EDUCATION/TRAINING PROGRAM

## 2023-10-27 PROCEDURE — 3079F PR MOST RECENT DIASTOLIC BLOOD PRESSURE 80-89 MM HG: ICD-10-PCS | Mod: CPTII,,, | Performed by: STUDENT IN AN ORGANIZED HEALTH CARE EDUCATION/TRAINING PROGRAM

## 2023-10-27 PROCEDURE — 3075F SYST BP GE 130 - 139MM HG: CPT | Mod: CPTII,,, | Performed by: STUDENT IN AN ORGANIZED HEALTH CARE EDUCATION/TRAINING PROGRAM

## 2023-10-27 PROCEDURE — 3075F PR MOST RECENT SYSTOLIC BLOOD PRESS GE 130-139MM HG: ICD-10-PCS | Mod: CPTII,,, | Performed by: STUDENT IN AN ORGANIZED HEALTH CARE EDUCATION/TRAINING PROGRAM

## 2023-10-27 PROCEDURE — 3079F DIAST BP 80-89 MM HG: CPT | Mod: CPTII,,, | Performed by: STUDENT IN AN ORGANIZED HEALTH CARE EDUCATION/TRAINING PROGRAM

## 2023-10-27 NOTE — PROGRESS NOTES
"    Subjective:      Jenn Nieto is a 38 y.o. female who comes for follow-up of sinusitis.  Her last visit with me was on 8/15/2023.  Never was able to get pneumovax booster, was told she didn't qualify based on her age. Feels that her sinuses are ok, would like to hold off scoping as she reports that she got a sinus infection from the last one.     Her current sinus regime consists of: Budesonide.     The assessment of quality and severity of symptoms as measured by the SNOT-22 score is deferred.     The patient's medications, allergies, past medical, surgical, social and family histories were reviewed and updated as appropriate.    A detailed review of systems was obtained with pertinent positives as per the above HPI, and otherwise negative.        Objective:     /84 (BP Location: Right arm, Patient Position: Sitting, BP Method: Large (Automatic))   Pulse 79   Ht 5' 7" (1.702 m)   Wt 123.2 kg (271 lb 9.7 oz)   BMI 42.54 kg/m²        Constitutional:   Vital signs are normal. She appears well-developed. Normal speech.      Head:  Normocephalic and atraumatic.     Ears:    Right Ear: No drainage or tenderness. No middle ear effusion.   Left Ear: No drainage or tenderness.  No middle ear effusion.     Mouth/Throat  Oropharynx clear and moist without lesions or asymmetry and normal uvula midline. No trismus. No oropharyngeal exudate. Mirror exam not performed due to patient tolerance.  Mirror exam not performed due to patient tolerance.      Neck:  Neck normal without thyromegaly masses, asymmetry, normal tracheal structure, crepitus, and tenderness, thyroid normal and trachea normal.     Pulmonary/Chest:   Effort normal.     Psychiatric:   She has a normal mood and affect. Her speech is normal.     Skin:   No abrasions, lacerations, lesions, or rashes.     Procedure    None    Data Reviewed    WBC (K/uL)   Date Value   02/06/2023 15.63 (H)     Eosinophil % (%)   Date Value   02/06/2023 0.0     Eos # (K/uL) "   Date Value   02/06/2023 0.0     Platelets (K/uL)   Date Value   02/06/2023 318     Glucose (mg/dL)   Date Value   02/06/2023 117 (H)     IgE (IU/mL)   Date Value   10/06/2022 43     I independently reviewed the images of the CT sinuses dated 5/19/23. Pertinent findings include left septal deviation with right spur. Narrow OMCs bilaterally. Left gilda bullosa. Small left frontal sinus. No significant sinonasal opacification bilaterally.    Assessment:     1. Chronic sinusitis, unspecified location    2. Nasal septal deviation    3. Hypertrophy of both inferior nasal turbinates      Plan:     - Referral to immunology for her pneumovax booster  - Cont budesonide  - RTC 6 months     Bennie Gilbert MD

## 2023-11-08 ENCOUNTER — OFFICE VISIT (OUTPATIENT)
Dept: URGENT CARE | Facility: CLINIC | Age: 38
End: 2023-11-08
Payer: MEDICAID

## 2023-11-08 VITALS
SYSTOLIC BLOOD PRESSURE: 122 MMHG | RESPIRATION RATE: 19 BRPM | BODY MASS INDEX: 42.53 KG/M2 | WEIGHT: 271 LBS | OXYGEN SATURATION: 100 % | DIASTOLIC BLOOD PRESSURE: 69 MMHG | HEIGHT: 67 IN | HEART RATE: 85 BPM | TEMPERATURE: 98 F

## 2023-11-08 DIAGNOSIS — R05.9 COUGH, UNSPECIFIED TYPE: ICD-10-CM

## 2023-11-08 DIAGNOSIS — R68.83 CHILLS: ICD-10-CM

## 2023-11-08 DIAGNOSIS — J98.01 ACUTE BRONCHOSPASM: ICD-10-CM

## 2023-11-08 DIAGNOSIS — R09.81 NASAL CONGESTION: ICD-10-CM

## 2023-11-08 DIAGNOSIS — J20.9 ACUTE BRONCHITIS, UNSPECIFIED ORGANISM: Primary | ICD-10-CM

## 2023-11-08 LAB
CTP QC/QA: YES
CTP QC/QA: YES
POC MOLECULAR INFLUENZA A AGN: NEGATIVE
POC MOLECULAR INFLUENZA B AGN: NEGATIVE
SARS-COV-2 AG RESP QL IA.RAPID: NEGATIVE

## 2023-11-08 PROCEDURE — 87811 SARS CORONAVIRUS 2 ANTIGEN POCT, MANUAL READ: ICD-10-PCS | Mod: QW,S$GLB,, | Performed by: NURSE PRACTITIONER

## 2023-11-08 PROCEDURE — 71046 XR CHEST PA AND LATERAL: ICD-10-PCS | Mod: FY,S$GLB,, | Performed by: STUDENT IN AN ORGANIZED HEALTH CARE EDUCATION/TRAINING PROGRAM

## 2023-11-08 PROCEDURE — 87502 INFLUENZA DNA AMP PROBE: CPT | Mod: QW,S$GLB,, | Performed by: NURSE PRACTITIONER

## 2023-11-08 PROCEDURE — 99214 PR OFFICE/OUTPT VISIT, EST, LEVL IV, 30-39 MIN: ICD-10-PCS | Mod: 25,S$GLB,, | Performed by: NURSE PRACTITIONER

## 2023-11-08 PROCEDURE — 94640 PR INHAL RX, AIRWAY OBST/DX SPUTUM INDUCT: ICD-10-PCS | Mod: S$GLB,,, | Performed by: FAMILY MEDICINE

## 2023-11-08 PROCEDURE — 94640 AIRWAY INHALATION TREATMENT: CPT | Mod: S$GLB,,, | Performed by: FAMILY MEDICINE

## 2023-11-08 PROCEDURE — 71046 X-RAY EXAM CHEST 2 VIEWS: CPT | Mod: FY,S$GLB,, | Performed by: STUDENT IN AN ORGANIZED HEALTH CARE EDUCATION/TRAINING PROGRAM

## 2023-11-08 PROCEDURE — 99214 OFFICE O/P EST MOD 30 MIN: CPT | Mod: 25,S$GLB,, | Performed by: NURSE PRACTITIONER

## 2023-11-08 PROCEDURE — 87502 POCT INFLUENZA A/B MOLECULAR: ICD-10-PCS | Mod: QW,S$GLB,, | Performed by: NURSE PRACTITIONER

## 2023-11-08 PROCEDURE — 87811 SARS-COV-2 COVID19 W/OPTIC: CPT | Mod: QW,S$GLB,, | Performed by: NURSE PRACTITIONER

## 2023-11-08 RX ORDER — PROMETHAZINE HYDROCHLORIDE AND DEXTROMETHORPHAN HYDROBROMIDE 6.25; 15 MG/5ML; MG/5ML
5 SYRUP ORAL NIGHTLY PRN
Qty: 120 ML | Refills: 0 | Status: SHIPPED | OUTPATIENT
Start: 2023-11-08 | End: 2023-11-18

## 2023-11-08 RX ORDER — IPRATROPIUM BROMIDE 0.5 MG/2.5ML
0.5 SOLUTION RESPIRATORY (INHALATION)
Status: COMPLETED | OUTPATIENT
Start: 2023-11-08 | End: 2023-11-08

## 2023-11-08 RX ORDER — ALBUTEROL SULFATE 0.83 MG/ML
2.5 SOLUTION RESPIRATORY (INHALATION)
Status: COMPLETED | OUTPATIENT
Start: 2023-11-08 | End: 2023-11-08

## 2023-11-08 RX ORDER — PREDNISONE 20 MG/1
40 TABLET ORAL DAILY
Qty: 10 TABLET | Refills: 0 | Status: SHIPPED | OUTPATIENT
Start: 2023-11-08 | End: 2023-11-13

## 2023-11-08 RX ORDER — ALBUTEROL SULFATE 90 UG/1
2 AEROSOL, METERED RESPIRATORY (INHALATION) EVERY 6 HOURS PRN
Qty: 18 G | Refills: 0 | Status: SHIPPED | OUTPATIENT
Start: 2023-11-08 | End: 2024-11-07

## 2023-11-08 RX ORDER — GUAIFENESIN, PSEUDOEPHEDRINE HYDROCHLORIDE 600; 60 MG/1; MG/1
1 TABLET, EXTENDED RELEASE ORAL DAILY
Qty: 10 TABLET | Refills: 0 | Status: SHIPPED | OUTPATIENT
Start: 2023-11-08 | End: 2023-11-18

## 2023-11-08 RX ADMIN — ALBUTEROL SULFATE 2.5 MG: 0.83 SOLUTION RESPIRATORY (INHALATION) at 10:11

## 2023-11-08 RX ADMIN — IPRATROPIUM BROMIDE 0.5 MG: 0.5 SOLUTION RESPIRATORY (INHALATION) at 10:11

## 2023-11-08 NOTE — PATIENT INSTRUCTIONS
STOP THE USE OF MEDROL DOSEPAK    Please drink plenty of fluids.  Please get plenty of rest.  Please return here or go to the Emergency Department for any concerns or worsening of condition.    If you do not have Hypertension or any history of palpitations, it is ok to take over the counter Sudafed or Mucinex D or Allegra-D or Claritin-D or Zyrtec-D.  If you do take one of the above, it is ok to combine that with plain over the counter Mucinex or Allegra or Claritin or Zyrtec.  If for example you are taking Zyrtec -D, you can combine that with Mucinex, but not Mucinex-D.  If you are taking Mucinex-D, you can combine that with plain Allegra or Claritin or Zyrtec.   If you do have Hypertension or palpitations, it is safe to take Coricidin HBP for relief of sinus symptoms.  If not allergic, please take over the counter Tylenol (Acetaminophen) and/or Motrin (Ibuprofen) as directed for control of pain and/or fever.  Please follow up with your primary care doctor or specialist as needed.    If you  smoke, please stop smoking.

## 2023-11-08 NOTE — PROGRESS NOTES
"Subjective:      Patient ID: Jenn Lance is a 38 y.o. female.    Vitals:  height is 5' 7" (1.702 m) and weight is 122.9 kg (271 lb). Her oral temperature is 98 °F (36.7 °C). Her blood pressure is 122/69 and her pulse is 85. Her respiration is 19 and oxygen saturation is 100%.     Chief Complaint: Nasal Congestion    38 yr old female came in with complaints of nasal congestion. Her symptoms started two weeks ago. She's having trouble breathing as well as a cough.    Other  This is a new problem. The current episode started 1 to 4 weeks ago. The problem occurs constantly. The problem has been gradually worsening. Associated symptoms include arthralgias, chills, congestion, coughing, fatigue, a fever and headaches. Pertinent negatives include no abdominal pain, anorexia, change in bowel habit, chest pain, diaphoresis, joint swelling, myalgias, nausea, neck pain, numbness, rash, sore throat, swollen glands, urinary symptoms, vertigo, visual change, vomiting or weakness. Nothing aggravates the symptoms. She has tried nothing for the symptoms.       Constitution: Positive for chills, fatigue and fever. Negative for sweating.   HENT:  Positive for congestion. Negative for ear pain and sore throat.    Neck: Negative for neck pain.   Cardiovascular:  Negative for chest pain.   Respiratory:  Positive for chest tightness, cough, sputum production, shortness of breath, wheezing and asthma.    Gastrointestinal:  Negative for abdominal pain, nausea and vomiting.   Musculoskeletal:  Positive for joint pain. Negative for joint swelling and muscle ache.   Skin:  Negative for rash.   Allergic/Immunologic: Positive for asthma.   Neurological:  Positive for headaches. Negative for history of vertigo and numbness.      Objective:     Physical Exam   Constitutional: She is oriented to person, place, and time. She appears well-developed. She is cooperative.  Non-toxic appearance. She does not appear ill. No distress.   HENT: "   Head: Normocephalic and atraumatic.   Ears:   Right Ear: Hearing, tympanic membrane, external ear and ear canal normal.   Left Ear: Hearing, tympanic membrane, external ear and ear canal normal.   Nose: Nose normal. No mucosal edema, rhinorrhea or nasal deformity. No epistaxis. Right sinus exhibits no maxillary sinus tenderness and no frontal sinus tenderness. Left sinus exhibits no maxillary sinus tenderness and no frontal sinus tenderness.   Mouth/Throat: Uvula is midline, oropharynx is clear and moist and mucous membranes are normal. No trismus in the jaw. Normal dentition. No uvula swelling. No oropharyngeal exudate, posterior oropharyngeal edema or posterior oropharyngeal erythema.   Eyes: Conjunctivae and lids are normal. No scleral icterus.   Neck: Trachea normal and phonation normal. Neck supple. No edema present. No erythema present. No neck rigidity present.   Cardiovascular: Normal rate, regular rhythm, normal heart sounds and normal pulses.   Pulmonary/Chest: Effort normal. No respiratory distress. She has no decreased breath sounds. She has wheezes in the right lower field and the left lower field. She has no rhonchi.   Abdominal: Normal appearance.   Musculoskeletal: Normal range of motion.         General: No deformity. Normal range of motion.   Neurological: She is alert and oriented to person, place, and time. She exhibits normal muscle tone. Coordination normal.   Skin: Skin is warm, dry, intact, not diaphoretic and not pale.   Psychiatric: Her speech is normal and behavior is normal. Judgment and thought content normal.   Nursing note and vitals reviewed.      Assessment:     1. Acute bronchitis, unspecified organism    2. Nasal congestion    3. Cough, unspecified type    4. Acute bronchospasm    5. Chills        Plan:   Patient presents with URI flu like symptoms for 2 weeks.  Cough and chest congestion persist.  She self treated with a medrol dosepak and over the counter medication.  She is on  day 4 of medrol dosepak w/ no relief of symptoms.  Fever last week.    Chest xray negative for pneumonia.  Flu negative in clinic.  Patient rechecked 1050 post breathing treatment and wheezing is no longer present.  Breath sounds clear to auscultation.  She is only using her nebulizer three times a day and is not using her albuterol inhaler.  She felt much improved post breathing treatment.  Room air pulse ox is 100%.  Results for orders placed or performed in visit on 11/08/23   SARS Coronavirus 2 Antigen, POCT Manual Read   Result Value Ref Range    SARS Coronavirus 2 Antigen Negative Negative     Acceptable Yes    POCT Influenza A/B MOLECULAR   Result Value Ref Range    POC Molecular Influenza A Ag Negative Negative, Not Reported    POC Molecular Influenza B Ag Negative Negative, Not Reported     Acceptable Yes      X-Ray Chest PA And Lateral    Result Date: 11/8/2023  EXAMINATION: XR CHEST PA AND LATERAL CLINICAL HISTORY: Cough, unspecified TECHNIQUE: PA and lateral views of the chest were performed. COMPARISON: None FINDINGS: Normal cardiomediastinal silhouette.  Lungs are symmetrically expanded.  No focal airspace opacity, pleural effusion, or pneumothorax.  Visualized osseous structures appear intact.  Left AC joint arthrosis.     No acute cardiopulmonary process. Electronically signed by: Hussein Mcdaniel Date:    11/08/2023 Time:    10:33     Acute bronchitis, unspecified organism  -     pseudoephedrine-guaiFENesin  mg (MUCINEX D)  mg per tablet; Take 1 tablet by mouth Daily. for 10 days  Dispense: 10 tablet; Refill: 0  -     promethazine-dextromethorphan (PROMETHAZINE-DM) 6.25-15 mg/5 mL Syrp; Take 5 mLs by mouth nightly as needed (nightly).  Dispense: 120 mL; Refill: 0  -     albuterol (PROAIR HFA) 90 mcg/actuation inhaler; Inhale 2 puffs into the lungs every 6 (six) hours as needed for Wheezing. Rescue  Dispense: 18 g; Refill: 0  -     predniSONE (DELTASONE) 20 MG  tablet; Take 2 tablets (40 mg total) by mouth once daily. for 5 days  Dispense: 10 tablet; Refill: 0    Nasal congestion  -     SARS Coronavirus 2 Antigen, POCT Manual Read  -     POCT Influenza A/B MOLECULAR    Cough, unspecified type  -     X-Ray Chest PA And Lateral; Future; Expected date: 11/08/2023  -     POCT Influenza A/B MOLECULAR    Acute bronchospasm  -     X-Ray Chest PA And Lateral; Future; Expected date: 11/08/2023  -     ipratropium 0.02 % nebulizer solution 0.5 mg  -     albuterol nebulizer solution 2.5 mg    Chills  -     POCT Influenza A/B MOLECULAR      Patient Instructions   STOP THE USE OF MEDROL DOSEPAK    Please drink plenty of fluids.  Please get plenty of rest.  Please return here or go to the Emergency Department for any concerns or worsening of condition.    If you do not have Hypertension or any history of palpitations, it is ok to take over the counter Sudafed or Mucinex D or Allegra-D or Claritin-D or Zyrtec-D.  If you do take one of the above, it is ok to combine that with plain over the counter Mucinex or Allegra or Claritin or Zyrtec.  If for example you are taking Zyrtec -D, you can combine that with Mucinex, but not Mucinex-D.  If you are taking Mucinex-D, you can combine that with plain Allegra or Claritin or Zyrtec.   If you do have Hypertension or palpitations, it is safe to take Coricidin HBP for relief of sinus symptoms.  If not allergic, please take over the counter Tylenol (Acetaminophen) and/or Motrin (Ibuprofen) as directed for control of pain and/or fever.  Please follow up with your primary care doctor or specialist as needed.    If you  smoke, please stop smoking.

## 2024-04-03 ENCOUNTER — OFFICE VISIT (OUTPATIENT)
Dept: URGENT CARE | Facility: CLINIC | Age: 39
End: 2024-04-03
Payer: MEDICAID

## 2024-04-03 VITALS
HEART RATE: 60 BPM | RESPIRATION RATE: 18 BRPM | DIASTOLIC BLOOD PRESSURE: 89 MMHG | BODY MASS INDEX: 42.21 KG/M2 | SYSTOLIC BLOOD PRESSURE: 159 MMHG | WEIGHT: 268.94 LBS | TEMPERATURE: 98 F | HEIGHT: 67 IN | OXYGEN SATURATION: 98 %

## 2024-04-03 DIAGNOSIS — J06.9 VIRAL URI WITH COUGH: Primary | ICD-10-CM

## 2024-04-03 DIAGNOSIS — J02.9 SORE THROAT: ICD-10-CM

## 2024-04-03 LAB
CTP QC/QA: YES
MOLECULAR STREP A: NEGATIVE
POC MOLECULAR INFLUENZA A AGN: NEGATIVE
POC MOLECULAR INFLUENZA B AGN: NEGATIVE
SARS-COV-2 AG RESP QL IA.RAPID: NEGATIVE

## 2024-04-03 PROCEDURE — 99214 OFFICE O/P EST MOD 30 MIN: CPT | Mod: S$GLB,,, | Performed by: FAMILY MEDICINE

## 2024-04-03 PROCEDURE — 87502 INFLUENZA DNA AMP PROBE: CPT | Mod: QW,S$GLB,, | Performed by: FAMILY MEDICINE

## 2024-04-03 PROCEDURE — 87811 SARS-COV-2 COVID19 W/OPTIC: CPT | Mod: QW,S$GLB,, | Performed by: FAMILY MEDICINE

## 2024-04-03 PROCEDURE — 87651 STREP A DNA AMP PROBE: CPT | Mod: QW,S$GLB,, | Performed by: FAMILY MEDICINE

## 2024-04-03 RX ORDER — PROMETHAZINE HYDROCHLORIDE AND DEXTROMETHORPHAN HYDROBROMIDE 6.25; 15 MG/5ML; MG/5ML
5 SYRUP ORAL EVERY 6 HOURS PRN
Qty: 118 ML | Refills: 0 | Status: SHIPPED | OUTPATIENT
Start: 2024-04-03

## 2024-04-03 RX ORDER — BENZONATATE 200 MG/1
200 CAPSULE ORAL 3 TIMES DAILY PRN
Qty: 30 CAPSULE | Refills: 0 | Status: SHIPPED | OUTPATIENT
Start: 2024-04-03 | End: 2024-04-13

## 2024-04-03 RX ORDER — CETIRIZINE HYDROCHLORIDE 10 MG/1
10 TABLET ORAL DAILY
Qty: 30 TABLET | Refills: 0 | Status: SHIPPED | OUTPATIENT
Start: 2024-04-03 | End: 2024-05-03

## 2024-04-03 RX ORDER — GUAIFENESIN 600 MG/1
1200 TABLET, EXTENDED RELEASE ORAL 2 TIMES DAILY
Qty: 40 TABLET | Refills: 0 | Status: SHIPPED | OUTPATIENT
Start: 2024-04-03 | End: 2024-04-13

## 2024-04-03 RX ORDER — FLUTICASONE PROPIONATE 50 MCG
1 SPRAY, SUSPENSION (ML) NASAL DAILY
Qty: 16 G | Refills: 0 | Status: SHIPPED | OUTPATIENT
Start: 2024-04-03

## 2024-04-03 NOTE — LETTER
April 3, 2024      [facility] [place]  [place] [address]  Phone: [phone]  Fax: [phone]       Patient: Jenn Lance   YOB: 1985  Date of Visit: 04/03/2024    To Whom It May Concern:    Shine Lance  was at [facility] on 04/03/2024. The patient may return to work/school on 4/4/2024 with no restrictions. If you have any questions or concerns, or if I can be of further assistance, please do not hesitate to contact me.    Sincerely,    Bree Alarcon NP

## 2024-04-03 NOTE — PATIENT INSTRUCTIONS
General Discharge Instructions   PLEASE READ YOUR DISCHARGE INSTRUCTIONS ENTIRELY AS IT CONTAINS IMPORTANT INFORMATION.  If you were prescribed a narcotic or controlled medication, do not drive or operate heavy equipment or machinery while taking these medications.  If you were prescribed antibiotics, please take them to completion.  You must understand that you've received an Urgent Care treatment only and that you may be released before all your medical problems are known or treated. You, the patient, will arrange for follow up care as instructed.    OVER THE COUNTER RECOMMENDATIONS/SUGGESTIONS.    Make sure to stay well hydrated.    Use Nasal Saline to mechanically move any post nasal drip from your eustachian tube or from the back of your throat.    Use warm salt water gargles to ease your throat pain. Warm salt water gargles as needed for sore throat- 1/2 tsp salt to 1 cup warm water, gargle as desired.    Use an antihistamine such as Claritin, Zyrtec or Allegra to dry you out.    Use pseudoephedrine (behind the counter) to decongest. Pseudoephedrine 30 mg up to 240 mg /day. It can raise your blood pressure and give you palpitations.    Use mucinex (guaifenesin) to break up mucous up to 2400mg/day to loosen any mucous.    The mucinex DM pill has a cough suppressant that can be sedating. It can be used at night to stop the tickle at the back of your throat.    You can use Mucinex D (it has guaifenesin and a high dose of pseudoephedrine) in the mornings to help decongest.    Use Afrin in each nare for no longer than 3 days, as it is addictive. It can also dry out your mucous membranes and cause elevated blood pressure. This is especially useful if you are flying.    Use Flonase 1-2 sprays/nostril per day. It is a local acting steroid nasal spray, if you develop a bloody nose, stop using the medication immediately.    Sometimes Nyquil at night is beneficial to help you get some rest, however it is sedating and it  does have an antihistamine, and tylenol.    Honey is a natural cough suppressant that can be used.    Tylenol up to 4,000 mg a day is safe for short periods and can be used for body aches, pain, and fever. However in high doses and prolonged use it can cause liver irritation.    Ibuprofen is a non-steroidal anti-inflammatory that can be used for body aches, pain, and fever.However it can also cause stomach irritation if over used.     Follow up with your PCP or specialty clinic as instructed in the next 2-3 days if not improved or as needed. You can call [phone] to schedule an appointment with appropriate provider.      If you condition worsens, we recommend that you receive another evaluation at the emergency room immediately or contact your primary medical clinic's after hours call service to discuss your concerns.      Please return here or go to the Emergency Department for any concerns or worsening condition.   You can also call [phone] to schedule an appointment with the appropriate provider.    Please return here or go to the Emergency Department for any concerns or worsening of condition.    Thank you for choosing [facility]!    Our goal in the Urgent Care is to always provide outstanding medical care. You may receive a survey by mail or e-mail in the next week regarding your experience today. We would greatly appreciate you completing and returning the survey. Your feedback provides us with a way to recognize our staff who provide very good care, and it helps us learn how to improve when your experience was below our aspiration of excellence.      We appreciate you trusting us with your medical care. We hope you feel better soon. We will be happy to take care of you for all of your future medical needs.    Sincerely,    ELIZABETH Jefferson  Patient Instructions   PLEASE READ YOUR DISCHARGE INSTRUCTIONS ENTIRELY AS IT CONTAINS IMPORTANT INFORMATION.        Please drink plenty of  "fluids. You body needs increased water but other beverages may aid in comfort.  You will know that you have had enough water to be hydrated when your urine is clear or at least a very pale yellow. Nasal saline may help with removal of mucus as well.  Ibuprofen is preferred for aches and pains as well as fever reduction. If you do not have high blood pressure, then you may use a decongestant such as pseudoephedrine or one of the above medications that have the letter, "-D" following it.  Hot tea with honey can help with sore throats as the heat with reduce the inflammation and the honey will coat your throat to help it feel better. Prescription pain medicine should be used for the significant throat sxs you are experiencing. Do not drive after taking this medication.     Lastly, good hand washing and cough hygiene (cough into your elbow) will help prevent the spread of the illness.  A general rule is that you are no longer contagious once you have been without a fever for over 24 hours without requiring fever reducing medications.   Please get plenty of rest.     Please return here or go to the Emergency Department for any concerns or worsening of condition.     Please take an over the counter antihistamine medication (allegra/Claritin/Zyrtec) of your choice as directed, they may help with some of the runny nose symptoms if you are having them.       Can continue mucinex. Use mucinex (guaifenisin) to break up mucous up to 2400mg/day to loosen any mucous. The mucinex DM pill has a cough suppressant that can be used at night to stop the tickle at the back of your throat. You may use Mucinex to help thin thick secretions to allow you to expel them but it only works if you drink more water.     If not allergic, please take over the counter Tylenol (Acetaminophen) and/or Motrin (Ibuprofen) as directed for control of pain and/or fever.  Please follow up with your primary care doctor or specialist as needed.     Sore throat " recommendations: Warm fluids, warm salt water gargles, throat lozenges, tea, honey, soup, rest, hydration.     Use over the counter flonase: one spray each nostril twice daily OR two sprays each nostril once daily.      Sinus rinses DO NOT USE TAP WATER, if you must, water must be a rolling boil for 1 minute, let it cool, then use.  May use distilled water, or over the counter nasal saline rinses.  Vics vapor rub in shower to help open nasal passages.  May use nasal gel to keep passages moisturized.  May use Nasal saline sprays during the day for added relief of congestion.   For those who go to the gym, please do not use the sauna or steam room now to clear sinuses.     If you  smoke, please stop smoking.        Please return or see your primary care doctor if you develop new or worsening symptoms.      Please arrange follow up with your primary medical clinic as soon as possible. You must understand that you've received an Urgent Care treatment only and that you may be released before all of your medical problems are known or treated. You, the patient, will arrange for follow up as instructed. If your symptoms worsen or fail to improve you should go to the Emergency Room.

## 2024-04-03 NOTE — PROGRESS NOTES
"Subjective:      Patient ID: Jenn Lance is a 39 y.o. female.    Vitals:  height is 5' 7" (1.702 m) and weight is 122 kg (268 lb 15.4 oz). Her oral temperature is 98 °F (36.7 °C). Her blood pressure is 159/89 (abnormal) and her pulse is 60. Her respiration is 18 and oxygen saturation is 98%.     Chief Complaint: Sore Throat    Pt present with sore throat, chills, fever, cough,  headaches, congestion, Sx started 4 days ago.  Pt requesting for flu covid and strep test.  Provider note begins below:  Past Medical History:  No date: Asthma  No date: Lupus  No date: Migraine headache   Pt with above pmh says she had a headache on Friday, cv neg, she says she started with congestion, cough, chills, fatigue. She says the fatigue is consistent with her lupus and flares up during her cycles. Denies hx of anemia, never requiring transfusion. She says she had a temp of 101.3 last night. No fever or chills. No cp or SOB. No GI related symptoms, including, N/v/D or constipation. No anosmia or ageusia. She has a nebulizer at home.       Sore Throat   This is a new problem. The current episode started in the past 7 days. The problem has been gradually worsening. The maximum temperature recorded prior to her arrival was 100.4 - 100.9 F. The pain is at a severity of 5/10. The pain is moderate. Associated symptoms include congestion, coughing, headaches, swollen glands and trouble swallowing. Pertinent negatives include no abdominal pain, diarrhea, drooling, ear discharge, ear pain, hoarse voice, plugged ear sensation, neck pain, shortness of breath, stridor or vomiting. She has had no exposure to strep or mono. She has tried NSAIDs for the symptoms.     Constitution: Positive for fatigue and fever. Negative for activity change, appetite change, chills, sweating and unexpected weight change.   HENT:  Positive for congestion, sinus pain, sinus pressure, sore throat and trouble swallowing. Negative for ear pain, ear discharge, " drooling and postnasal drip.    Neck: Negative for neck pain.   Cardiovascular:  Negative for chest pain, leg swelling and palpitations.   Respiratory:  Positive for cough. Negative for chest tightness, sputum production, bloody sputum, COPD, shortness of breath and stridor.    Gastrointestinal:  Negative for abdominal pain, vomiting and diarrhea.   Neurological:  Positive for headaches.        Objective:     Physical Exam   Constitutional: She is oriented to person, place, and time. She appears well-developed.  Non-toxic appearance. She does not appear ill. No distress.   HENT:   Head: Normocephalic and atraumatic.   Ears:   Right Ear: External ear normal.   Left Ear: External ear normal.   Nose: Nose normal.   Mouth/Throat: Uvula is midline, oropharynx is clear and moist and mucous membranes are normal. No trismus in the jaw. No uvula swelling. Cobblestoning present. No oropharyngeal exudate, posterior oropharyngeal edema, posterior oropharyngeal erythema or tonsillar abscesses. No tonsillar exudate.   Eyes: Conjunctivae, EOM and lids are normal. Pupils are equal, round, and reactive to light.   Neck: Trachea normal and phonation normal. Neck supple.   Pulmonary/Chest: Effort normal and breath sounds normal.   Musculoskeletal: Normal range of motion.         General: Normal range of motion.   Neurological: She is alert and oriented to person, place, and time.   Skin: Skin is warm, dry, intact and not diaphoretic.   Psychiatric: Her speech is normal and behavior is normal. Judgment and thought content normal.   Nursing note and vitals reviewed.      Results for orders placed or performed in visit on 04/03/24   SARS Coronavirus 2 Antigen, POCT Manual Read   Result Value Ref Range    SARS Coronavirus 2 Antigen Negative Negative     Acceptable Yes    POCT Strep A, Molecular   Result Value Ref Range    Molecular Strep A, POC Negative Negative     Acceptable Yes    POCT Influenza A/B  MOLECULAR   Result Value Ref Range    POC Molecular Influenza A Ag Negative Negative, Not Reported    POC Molecular Influenza B Ag Negative Negative, Not Reported     Acceptable Yes       Assessment:     1. Viral URI with cough    2. Sore throat        Plan:     Cv, flu and strep neg, vss, lungs ctab, otc meds reviewed, nad    Discussed results/diagnosis/plan with patient in clinic. Strict precautions given to patient to monitor for worsening signs and symptoms. Advised to follow up with PCP or specialist.    Explained side effects of medications prescribed with patient and informed him/her to discontinue use if he/she has any side effects and to inform UC or PCP if this occurs. All questions answered. Strict ED verses clinic return precautions stressed and given in depth. Advised if symptoms worsens of fail to improve he/she should go to the Emergency Room. Discharge and follow-up instructions given verbally/printed with the patient who expressed understanding and willingness to comply with my recommendations. Patient voiced understanding and in agreement with current treatment plan. Patient exits the exam room in no acute distress. Conversant and engaged during discharge discussion, verbalized understanding.      Viral URI with cough  -     guaiFENesin (MUCINEX) 600 mg 12 hr tablet; Take 2 tablets (1,200 mg total) by mouth 2 (two) times daily. for 10 days  Dispense: 40 tablet; Refill: 0  -     benzonatate (TESSALON) 200 MG capsule; Take 1 capsule (200 mg total) by mouth 3 (three) times daily as needed.  Dispense: 30 capsule; Refill: 0  -     cetirizine (ZYRTEC) 10 MG tablet; Take 1 tablet (10 mg total) by mouth once daily.  Dispense: 30 tablet; Refill: 0  -     fluticasone propionate (FLONASE) 50 mcg/actuation nasal spray; 1 spray (50 mcg total) by Each Nostril route once daily.  Dispense: 16 g; Refill: 0  -     promethazine-dextromethorphan (PROMETHAZINE-DM) 6.25-15 mg/5 mL Syrp; Take 5 mLs by  mouth every 6 (six) hours as needed (cough).  Dispense: 118 mL; Refill: 0    Sore throat  -     SARS Coronavirus 2 Antigen, POCT Manual Read  -     POCT Strep A, Molecular  -     POCT Influenza A/B MOLECULAR             Additional MDM:     Heart Failure Score:   COPD = No

## 2024-04-09 ENCOUNTER — TELEPHONE (OUTPATIENT)
Dept: OTOLARYNGOLOGY | Facility: CLINIC | Age: 39
End: 2024-04-09
Payer: MEDICAID

## 2024-04-10 ENCOUNTER — TELEPHONE (OUTPATIENT)
Dept: OBSTETRICS AND GYNECOLOGY | Facility: CLINIC | Age: 39
End: 2024-04-10
Payer: MEDICAID

## 2024-04-10 NOTE — TELEPHONE ENCOUNTER
Pt is requesting an appointment for vaginal irritation.    Called pt in regards to appt request. Lvm for pt to give a call back to schedule the appointment.

## 2024-04-15 ENCOUNTER — OFFICE VISIT (OUTPATIENT)
Dept: URGENT CARE | Facility: CLINIC | Age: 39
End: 2024-04-15
Payer: MEDICAID

## 2024-04-15 VITALS
SYSTOLIC BLOOD PRESSURE: 121 MMHG | DIASTOLIC BLOOD PRESSURE: 87 MMHG | WEIGHT: 268 LBS | RESPIRATION RATE: 18 BRPM | HEART RATE: 89 BPM | TEMPERATURE: 98 F | OXYGEN SATURATION: 99 % | BODY MASS INDEX: 42.06 KG/M2 | HEIGHT: 67 IN

## 2024-04-15 DIAGNOSIS — J01.90 ACUTE BACTERIAL SINUSITIS: Primary | ICD-10-CM

## 2024-04-15 DIAGNOSIS — R05.1 ACUTE COUGH: ICD-10-CM

## 2024-04-15 DIAGNOSIS — B96.89 ACUTE BACTERIAL SINUSITIS: Primary | ICD-10-CM

## 2024-04-15 DIAGNOSIS — J32.9 CHRONIC SINUSITIS, UNSPECIFIED LOCATION: Primary | ICD-10-CM

## 2024-04-15 DIAGNOSIS — J02.9 SORE THROAT: ICD-10-CM

## 2024-04-15 LAB
CTP QC/QA: YES
MOLECULAR STREP A: NEGATIVE

## 2024-04-15 PROCEDURE — 87651 STREP A DNA AMP PROBE: CPT | Mod: QW,S$GLB,,

## 2024-04-15 PROCEDURE — 99213 OFFICE O/P EST LOW 20 MIN: CPT | Mod: S$GLB,,,

## 2024-04-15 RX ORDER — FLUCONAZOLE 150 MG/1
150 TABLET ORAL DAILY
Qty: 1 TABLET | Refills: 0 | Status: SHIPPED | OUTPATIENT
Start: 2024-04-15 | End: 2024-04-16

## 2024-04-15 RX ORDER — AMOXICILLIN AND CLAVULANATE POTASSIUM 875; 125 MG/1; MG/1
1 TABLET, FILM COATED ORAL EVERY 12 HOURS
Qty: 14 TABLET | Refills: 0 | Status: SHIPPED | OUTPATIENT
Start: 2024-04-15 | End: 2024-04-22

## 2024-04-15 RX ORDER — ALBUTEROL SULFATE 0.63 MG/3ML
0.63 SOLUTION RESPIRATORY (INHALATION) EVERY 6 HOURS PRN
COMMUNITY

## 2024-04-15 RX ORDER — PROMETHAZINE HYDROCHLORIDE AND DEXTROMETHORPHAN HYDROBROMIDE 6.25; 15 MG/5ML; MG/5ML
5 SYRUP ORAL NIGHTLY PRN
Qty: 118 ML | Refills: 0 | Status: SHIPPED | OUTPATIENT
Start: 2024-04-15 | End: 2024-04-25

## 2024-04-15 RX ORDER — AZITHROMYCIN 250 MG/1
TABLET, FILM COATED ORAL
Qty: 6 TABLET | Refills: 0 | Status: SHIPPED | OUTPATIENT
Start: 2024-04-15 | End: 2024-04-20

## 2024-04-15 NOTE — PROGRESS NOTES
"Subjective:      Patient ID: Jenn Lance is a 39 y.o. female.    Vitals:  height is 5' 7" (1.702 m) and weight is 121.6 kg (268 lb). Her oral temperature is 98.2 °F (36.8 °C). Her blood pressure is 121/87 and her pulse is 89. Her respiration is 18 and oxygen saturation is 99%.     Chief Complaint: Sore Throat and Chest Congestion    Patient is a 39-year-old female with complaint of congestion and cough for 2 weeks.  Patient states that she now has green phlegm, as well as a cough that she feels like is in her chest.  She has sinus pressure and ear pressure.  She states she was supposed to be seen at ENT today however she was told to go to the wrong location and therefore missed the appointment.  ENT called in a Z-Allen for her however she does not feel comfortable taking medications when she was not seen by the physician who was prescribing them for her.  She denies any fever, chest pain, or shortness of breath.    Sore Throat   This is a new problem. The current episode started 1 to 4 weeks ago. The problem has been unchanged. The maximum temperature recorded prior to her arrival was 100.4 - 100.9 F. The pain is at a severity of 7/10. The pain is moderate. Associated symptoms include congestion and coughing. Pertinent negatives include no abdominal pain, ear pain, headaches, neck pain or shortness of breath. She has tried gargles for the symptoms. The treatment provided no relief.       Constitution: Negative for fever and generalized weakness.   HENT:  Positive for congestion, sinus pressure and sore throat. Negative for ear pain and sinus pain.    Neck: Negative for neck pain.   Cardiovascular:  Negative for chest pain.   Respiratory:  Positive for cough. Negative for shortness of breath.    Gastrointestinal:  Negative for abdominal pain.   Neurological:  Negative for headaches.      Objective:     Physical Exam   Constitutional: She is oriented to person, place, and time. She appears well-developed. She is " cooperative.  Non-toxic appearance. She does not appear ill. No distress.   HENT:   Head: Normocephalic and atraumatic.   Ears:   Right Ear: Hearing, external ear and ear canal normal. Tympanic membrane is not erythematous and not bulging. A middle ear effusion (Serous) is present.   Left Ear: Hearing, external ear and ear canal normal. Tympanic membrane is not erythematous and not bulging. A middle ear effusion (Serous) is present.   Nose: No mucosal edema, rhinorrhea or nasal deformity. No epistaxis. Right sinus exhibits maxillary sinus tenderness and frontal sinus tenderness. Left sinus exhibits maxillary sinus tenderness and frontal sinus tenderness.   Mouth/Throat: Uvula is midline, oropharynx is clear and moist and mucous membranes are normal. No trismus in the jaw. Normal dentition. No uvula swelling. No oropharyngeal exudate, posterior oropharyngeal edema or posterior oropharyngeal erythema.   Eyes: Conjunctivae and lids are normal. No scleral icterus.   Neck: Trachea normal and phonation normal. Neck supple. No edema present. No erythema present. No neck rigidity present.   Cardiovascular: Normal rate, regular rhythm, normal heart sounds and normal pulses.   Pulses:       Radial pulses are 2+ on the right side and 2+ on the left side.   Pulmonary/Chest: Effort normal and breath sounds normal. No tachypnea. No respiratory distress. She has no decreased breath sounds. She has no wheezes. She has no rhonchi.   Clear breath signs bilaterally, no wheezing.         Comments: Clear breath signs bilaterally, no wheezing.    Abdominal: Normal appearance.   Musculoskeletal: Normal range of motion.         General: No deformity. Normal range of motion.   Neurological: She is alert and oriented to person, place, and time. She exhibits normal muscle tone. Coordination normal.   Skin: Skin is warm, dry, intact, not diaphoretic and not pale.   Psychiatric: Her speech is normal and behavior is normal. Judgment and thought  content normal.   Nursing note and vitals reviewed.      Assessment:     1. Acute bacterial sinusitis    2. Sore throat    3. Acute cough        Plan:   Patient is very well-appearing, alert and active, VSS, afebrile without recent antipyretic, and appears well-hydrated.  Physical exam as documented above, no clinical evidence of respiratory failure, dehydration, or systemic bacterial infection at this time.  Based on history and physical exam, patient likely has bacterial sinusitis secondary to her initial viral URI.  Based on physical exam low suspicion of pneumonia at this time.  We will prescribe Augmentin for patient, as she states she would prefer this over the Z-Allen per prescribed by ENT.  We will also refill promethazine syrup as patient states she is out.  Discussed use of over-the-counter medications for symptoms as well as supportive care and return precautions. Patient verbalizes understanding and agrees to follow-up with PCP as needed.     Results for orders placed or performed in visit on 04/15/24   POCT Strep A, Molecular   Result Value Ref Range    Molecular Strep A, POC Negative Negative     Acceptable Yes          Acute bacterial sinusitis  -     amoxicillin-clavulanate 875-125mg (AUGMENTIN) 875-125 mg per tablet; Take 1 tablet by mouth every 12 (twelve) hours. for 7 days  Dispense: 14 tablet; Refill: 0  -     fluconazole (DIFLUCAN) 150 MG Tab; Take 1 tablet (150 mg total) by mouth once daily. for 1 day  Dispense: 1 tablet; Refill: 0    Sore throat  -     POCT Strep A, Molecular    Acute cough  -     promethazine-dextromethorphan (PROMETHAZINE-DM) 6.25-15 mg/5 mL Syrp; Take 5 mLs by mouth nightly as needed.  Dispense: 118 mL; Refill: 0      Patient Instructions   Take antibiotics for the full 7 days.    May take Diflucan if symptoms of yeast infection occur.    Take promethazine at night for cough.    Please return to urgent care if you have any shortness a breath, chest pain, fever,  dizziness, loss of consciousness, respiratory distress.

## 2024-04-15 NOTE — PATIENT INSTRUCTIONS
Take antibiotics for the full 7 days.    May take Diflucan if symptoms of yeast infection occur.    Take promethazine at night for cough.    Please return to urgent care if you have any shortness a breath, chest pain, fever, dizziness, loss of consciousness, respiratory distress.

## 2024-04-15 NOTE — LETTER
April 15, 2024      [facility] [place]  [place] [address]  Phone: [phone]  Fax: [phone]       Patient: Jenn Lance   YOB: 1985  Date of Visit: 04/15/2024    To Whom It May Concern:    Shine Lance  was at [facility] on 04/15/2024. The patient may return to work on 4/17/24 with no restrictions. If you have any questions or concerns, or if I can be of further assistance, please do not hesitate to contact me.    Sincerely,          Mikayla Beckett NP

## 2024-07-23 ENCOUNTER — HOSPITAL ENCOUNTER (EMERGENCY)
Facility: HOSPITAL | Age: 39
Discharge: HOME | End: 2024-07-23
Attending: EMERGENCY MEDICINE
Payer: MEDICAID

## 2024-07-23 VITALS
WEIGHT: 250 LBS | OXYGEN SATURATION: 98 % | DIASTOLIC BLOOD PRESSURE: 79 MMHG | HEART RATE: 88 BPM | TEMPERATURE: 98 F | BODY MASS INDEX: 39.16 KG/M2 | SYSTOLIC BLOOD PRESSURE: 140 MMHG | RESPIRATION RATE: 19 BRPM

## 2024-07-23 DIAGNOSIS — U07.1 COVID-19 VIRUS DETECTED: ICD-10-CM

## 2024-07-23 DIAGNOSIS — U07.1 COVID-19: Primary | ICD-10-CM

## 2024-07-23 DIAGNOSIS — R05.9 COUGH: ICD-10-CM

## 2024-07-23 LAB
B-HCG UR QL: NEGATIVE
CTP QC/QA: YES
GROUP A STREP, MOLECULAR: NEGATIVE
INFLUENZA A, MOLECULAR: NEGATIVE
INFLUENZA B, MOLECULAR: NEGATIVE
SARS-COV-2 RDRP RESP QL NAA+PROBE: POSITIVE
SPECIMEN SOURCE: NORMAL

## 2024-07-23 PROCEDURE — U0002 COVID-19 LAB TEST NON-CDC: HCPCS | Mod: ER | Performed by: EMERGENCY MEDICINE

## 2024-07-23 PROCEDURE — 87502 INFLUENZA DNA AMP PROBE: CPT | Mod: ER | Performed by: EMERGENCY MEDICINE

## 2024-07-23 PROCEDURE — 87651 STREP A DNA AMP PROBE: CPT | Mod: ER | Performed by: EMERGENCY MEDICINE

## 2024-07-23 PROCEDURE — 81025 URINE PREGNANCY TEST: CPT | Mod: ER | Performed by: PHYSICIAN ASSISTANT

## 2024-07-23 PROCEDURE — 99284 EMERGENCY DEPT VISIT MOD MDM: CPT | Mod: 25,ER

## 2024-07-23 RX ORDER — BENZONATATE 100 MG/1
100 CAPSULE ORAL 3 TIMES DAILY PRN
Qty: 20 CAPSULE | Refills: 0 | Status: SHIPPED | OUTPATIENT
Start: 2024-07-23 | End: 2024-08-02

## 2024-07-23 NOTE — Clinical Note
"Ejnn Emilia "Tramaine Lance was seen and treated in our emergency department on 7/23/2024.     COVID-19 is present in our communities across the state. There is limited testing for COVID at this time, so not all patients can be tested. In this situation, your employee meets the following criteria:    Jenn Lance has met the criteria for COVID-19 testing and has a POSITIVE result. She can return to work once they are asymptomatic for 24 hours without the use of fever reducing medications AND at least five days from the first positive result. A mask is recommended for 5 days post quarantine.     If you have any questions or concerns, or if I can be of further assistance, please do not hesitate to contact me.    Sincerely,             Alexis Bustillos PA-C"

## 2024-07-23 NOTE — ED PROVIDER NOTES
Encounter Date: 2024       History     Chief Complaint   Patient presents with    body aches and nasal congestion     Pt states she has body aches and congestion x 3 days and co-worker dx with covid.      This is a 39-year-old white female with significant past medical history of asthma, lupus, and migraine headaches that presents the ED with complaint of 3 days of subjective fever, runny nose, congestion, sore throat, and cough.  The patient states she was concerned because of her history of lupus and pneumonia and she was trying to prevent that from occurring today.  She was requesting a chest x-ray.  She denies any objective fever, chest pain, shortness for breath, left arm pain, jaw pain, palpitations, abdominal pain, nausea, vomiting, diarrhea.  She states she was around a co-worker who tested positive for COVID.      Review of patient's allergies indicates:   Allergen Reactions    Toradol [ketorolac] Swelling     Past Medical History:   Diagnosis Date    Asthma     Lupus     Migraine headache      Past Surgical History:   Procedure Laterality Date    TYMPANOSTOMY TUBE PLACEMENT       Family History   Problem Relation Name Age of Onset    Hypertension Mother      Hypertension Father      Hyperlipidemia Father      Heart disease Father      Diabetes Mellitus Father       Social History     Tobacco Use    Smoking status: Some Days     Current packs/day: 0.00     Types: Cigarettes     Last attempt to quit: 2017     Years since quittin.8    Smokeless tobacco: Never   Substance Use Topics    Alcohol use: No    Drug use: No     Review of Systems   Constitutional:  Positive for chills and diaphoresis. Negative for fever.   HENT:  Positive for congestion, rhinorrhea and sore throat.    Respiratory:  Positive for cough.    Cardiovascular:  Negative for chest pain and palpitations.   Gastrointestinal:  Negative for diarrhea, nausea and vomiting.   Musculoskeletal:  Positive for myalgias.       Physical Exam      Initial Vitals [07/23/24 1233]   BP Pulse Resp Temp SpO2   (!) 140/79 88 19 98 °F (36.7 °C) 98 %      MAP       --         Physical Exam    Constitutional: She appears well-developed and well-nourished.   HENT:   Head: Normocephalic and atraumatic.   Right Ear: Hearing, tympanic membrane, external ear and ear canal normal.   Left Ear: Hearing, tympanic membrane, external ear and ear canal normal.   Nose: Rhinorrhea present.   Mouth/Throat: Uvula is midline and mucous membranes are normal. Posterior oropharyngeal erythema present. No oropharyngeal exudate, posterior oropharyngeal edema or tonsillar abscesses.   Cardiovascular:  Normal rate, regular rhythm and normal heart sounds.           Pulmonary/Chest: Breath sounds normal. She has no wheezes.     Neurological: She is alert and oriented to person, place, and time.   Psychiatric: She has a normal mood and affect. Thought content normal.         ED Course   Procedures  Labs Reviewed   SARS-COV-2 RNA AMPLIFICATION, QUAL - Abnormal       Result Value    SARS-CoV-2 RNA, Amplification, Qual Positive (*)    INFLUENZA A & B BY MOLECULAR    Influenza A, Molecular Negative      Influenza B, Molecular Negative      Flu A & B Source Nasal swab     GROUP A STREP, MOLECULAR    Group A Strep, Molecular Negative     POCT URINE PREGNANCY    POC Preg Test, Ur Negative       Acceptable Yes            Imaging Results              X-Ray Chest PA And Lateral (Final result)  Result time 07/23/24 13:26:10      Final result by Steve John MD (07/23/24 13:26:10)                   Impression:      1.  Negative for acute process involving the chest.    2.  Stable findings as noted above.      Electronically signed by: Steve John MD  Date:    07/23/2024  Time:    13:26               Narrative:    EXAMINATION:  XR CHEST PA AND LATERAL    CLINICAL HISTORY:  Cough, unspecified    COMPARISON:  Studies dating back to September 1, 2020    FINDINGS:  The study is slightly  rotated to the left.  The lungs are clear. The cardiac silhouette size is normal. The trachea is midline and the mediastinal width is normal. Negative for focal infiltrate, effusion or pneumothorax. Pulmonary vasculature is normal. Negative for osseous abnormalities. Stable eventration of the hemidiaphragms, posttraumatic changes to the distal left clavicle, left humeral bone island and marginal spondylosis.                                    X-Rays:   Independently Interpreted Readings:   Other Readings:  Chest x-ray was independently interpreted by me and shows no acute cardiopulmonary process.    Medications - No data to display  Medical Decision Making  This is a 39-year-old white female with history of lupus and pneumonia that presents to the ED with complaint of 3 days of subjective fever, rhinorrhea, congestion, sore throat, and cough.  On arrival the patient is afebrile and nontoxic-appearing with stable vital signs.  Differential diagnoses include but are not limited to: COVID-19, influenza, strep pharyngitis, pneumonia, viral URI with cough.  Please see physical exam for further details.  Influenza and rapid strep screens were both negative.  However, the patient has tested positive for COVID-19.  Chest x-ray was independently interpreted by me and shows no acute cardiopulmonary process.  The patient will be symptomatically treated with Tessalon Perles and has been instructed to take Tylenol/Motrin, rest, hydrate, and isolate.  A work note was provided.  She is to have PCP follow up in the next 2-3 days or return to the ED for worsening.  She verbalized understanding and was agreeable to the treatment plan.    Amount and/or Complexity of Data Reviewed  Labs: ordered.  Radiology: ordered.                                      Clinical Impression:  Final diagnoses:  [R05.9] Cough  [U07.1] COVID-19 (Primary)          ED Disposition Condition    Discharge Stable          ED Prescriptions       Medication Sig  Dispense Start Date End Date Auth. Provider    benzonatate (TESSALON) 100 MG capsule Take 1 capsule (100 mg total) by mouth 3 (three) times daily as needed. 20 capsule 7/23/2024 8/2/2024 Alexis Bustillos PA-C          Follow-up Information       Follow up With Specialties Details Why Contact Info    Delaney Mandujano MD Family Medicine Schedule an appointment as soon as possible for a visit in 2 days  [id number] [facility] [id number] [phone]      [facility] - Emergency Dept Emergency Medicine  If symptoms worsen [address] [facility]  Emergency Department  [facility] [address] [phone]             Alexis Bustillos PA-C  07/23/24 9443

## 2024-08-28 ENCOUNTER — OFFICE VISIT (OUTPATIENT)
Dept: URGENT CARE | Facility: CLINIC | Age: 39
End: 2024-08-28
Payer: MEDICAID

## 2024-08-28 VITALS
RESPIRATION RATE: 20 BRPM | HEART RATE: 85 BPM | HEIGHT: 67 IN | DIASTOLIC BLOOD PRESSURE: 87 MMHG | WEIGHT: 250 LBS | SYSTOLIC BLOOD PRESSURE: 133 MMHG | OXYGEN SATURATION: 97 % | BODY MASS INDEX: 39.24 KG/M2 | TEMPERATURE: 98 F

## 2024-08-28 DIAGNOSIS — B37.0 ORAL CANDIDIASIS: Primary | ICD-10-CM

## 2024-08-28 DIAGNOSIS — B37.31 VAGINAL YEAST INFECTION: ICD-10-CM

## 2024-08-28 DIAGNOSIS — Z92.240 PERSONAL HISTORY OF INHALED STEROID THERAPY: ICD-10-CM

## 2024-08-28 DIAGNOSIS — K14.6 TONGUE PAIN: ICD-10-CM

## 2024-08-28 DIAGNOSIS — M32.9 PERSONAL HISTORY OF SYSTEMIC LUPUS ERYTHEMATOSUS (SLE): ICD-10-CM

## 2024-08-28 LAB
CTP QC/QA: YES
SARS-COV-2 AG RESP QL IA.RAPID: NEGATIVE

## 2024-08-28 PROCEDURE — 99213 OFFICE O/P EST LOW 20 MIN: CPT | Mod: S$GLB,,,

## 2024-08-28 PROCEDURE — 87811 SARS-COV-2 COVID19 W/OPTIC: CPT | Mod: QW,S$GLB,,

## 2024-08-28 RX ORDER — NYSTATIN 100000 [USP'U]/ML
4 SUSPENSION ORAL 4 TIMES DAILY
Qty: 160 ML | Refills: 0 | Status: SHIPPED | OUTPATIENT
Start: 2024-08-28 | End: 2024-09-07

## 2024-08-28 RX ORDER — FLUCONAZOLE 150 MG/1
150 TABLET ORAL DAILY
Qty: 2 TABLET | Refills: 0 | Status: SHIPPED | OUTPATIENT
Start: 2024-08-28 | End: 2024-08-30

## 2024-08-28 NOTE — PROGRESS NOTES
"Subjective:      Patient ID: Jenn Lance is a 39 y.o. female.    Vitals:  height is 5' 7" (1.702 m) and weight is 113.4 kg (250 lb). Her oral temperature is 98 °F (36.7 °C). Her blood pressure is 133/87 and her pulse is 85. Her respiration is 20 and oxygen saturation is 97%.     Chief Complaint: Sinus Problem and Tongue Pain     Pt present with symptoms of- Tongue pain that is burning and has cracks in her tongue , Sore Throat, Runny Nose, Congestion , Headache , Clammy, Body aches, Sneezing  and Cough that developed Monday. Pt has been exposed to COVID at work.     Provider note starts below:  Patient presents to clinic for evaluation of multiple complaints. States she has had tongue pain, sore throat, white film on tongue, and cracks in her tongue for the past 3-4 weeks. She has been using Magic Mouthwash at home which temporarily improves the pain. She thought throat was dry due to inhaled steroid use but symptoms have not been improving. States she had COVID about a month ago and was using inhaler every few hours to help with breathing. States a coworker tested positive several days ago and she was concerned that she may have gotten it again. Second complaint is vaginal discharge and itching, consistent with yeast infection. States she is immunocompromised with hx of lupus and is prone to yeast infections.     Sinus Problem  This is a new problem. The current episode started in the past 7 days. The problem has been gradually worsening since onset. There has been no fever. Her pain is at a severity of 4/10 (Tongue Pain). The pain is moderate. Associated symptoms include a sore throat. Pertinent negatives include no chills, congestion, coughing, ear pain, headaches, neck pain, shortness of breath or sneezing. Treatments tried: OTC- Cold Meds, Motrin and Benadryl. The treatment provided mild relief.       Constitution: Negative for chills and fever.   HENT:  Positive for tongue pain, tongue lesion (white), " sore throat and trouble swallowing. Negative for ear pain, dental problem, drooling, mouth sores, congestion, postnasal drip and voice change.    Neck: Negative for neck pain and neck stiffness.   Cardiovascular:  Negative for chest pain.   Respiratory:  Negative for cough, shortness of breath and stridor.    Gastrointestinal:  Negative for abdominal pain, nausea and vomiting.   Genitourinary:  Positive for vaginal discharge. Negative for vaginal bleeding.        +vaginal itching   Musculoskeletal:  Negative for muscle cramps and muscle ache.   Skin:  Negative for pale and rash.   Allergic/Immunologic: Negative for itching and sneezing.   Neurological:  Negative for dizziness, light-headedness, headaches, disorientation and altered mental status.   Psychiatric/Behavioral:  Negative for altered mental status, disorientation and confusion.       Objective:     Physical Exam   Constitutional: She is oriented to person, place, and time.  Non-toxic appearance. She does not appear ill. No distress.   HENT:   Head: Normocephalic and atraumatic.   White plaques, erythema, and superficial fissuring of tongue.      Comments: White plaques, erythema, and superficial fissuring of tongue.  Eyes: Conjunctivae are normal. Extraocular movement intact   Neck: Neck supple.   Cardiovascular: Normal rate, regular rhythm, normal heart sounds and normal pulses.   Pulmonary/Chest: Effort normal and breath sounds normal.   Abdominal: Normal appearance.   Genitourinary:         Comments: deferred     Musculoskeletal: Normal range of motion.         General: Normal range of motion.   Neurological: She is alert, oriented to person, place, and time and at baseline.   Skin: Skin is warm and dry.   Psychiatric: Her behavior is normal. Mood normal.   Nursing note and vitals reviewed.    Assessment:     Results for orders placed or performed in visit on 08/28/24   SARS Coronavirus 2 Antigen, POCT Manual Read   Result Value Ref Range    SARS  "Coronavirus 2 Antigen Negative Negative     Acceptable Yes        1. Oral candidiasis    2. Vaginal yeast infection    3. Tongue pain    4. Personal history of systemic lupus erythematosus (SLE)    5. Personal history of inhaled steroid therapy        Plan:     Oral candidiasis  -     nystatin (MYCOSTATIN) 100,000 unit/mL suspension; Take 4 mLs (400,000 Units total) by mouth 4 (four) times daily. for 10 days  Dispense: 160 mL; Refill: 0    Vaginal yeast infection  -     fluconazole (DIFLUCAN) 150 MG Tab; Take 1 tablet (150 mg total) by mouth once daily. Take one tablet when you get prescription. If symptoms do not improve, take second tablet 72 hours later. for 2 doses  Dispense: 2 tablet; Refill: 0    Tongue pain  -     SARS Coronavirus 2 Antigen, POCT Manual Read  -     (Magic mouthwash) 1:1:1 diphenhydrAMINE(Benadryl) 12.5mg/5ml liq, aluminum & magnesium hydroxide-simethicone (Maalox), LIDOcaine viscous 2%; Swish and spit 5 mLs every 4 (four) hours as needed (gargle and spit for sore throat). for mouth sores  Dispense: 360 mL; Refill: 0    Personal history of systemic lupus erythematosus (SLE)    Personal history of inhaled steroid therapy            Patient Instructions   Thrush  Thrush is a type of yeast infection that you can get in your mouth. Germs, called fungi, cause yeast infections. These germs live almost everywhere on your body. Most often, your immune system can control the amount of yeast and you stay healthy. If you are sick, the yeast can multiply and cause an infection.  Thrush causes white patches inside your mouth. You may also have redness, bleeding, or soreness. In other parts of your body, yeast infections can cause itching, burning or cracking of the skin. People who are breastfeeding may develop thrush on their nipples. Babies who are  can also develop oral thrush.    Treatment  Nystatin swish and swallow  4 mLs 4 times daily until symptoms resolve.  "Magic " "Mouthwash" as needed for pain. Swish, gargle, and spit.    What care is needed at home?   For adults and children:  Brush your teeth and tongue at least two times each day with a soft toothbrush. Floss every night. Change your toothbrush as ordered.  If you have dentures or retainers, clean them well every night.  Do not use over-the-counter (OTC) mouthwashes. They can kill healthy bacteria, which you may need to help recover.  Rinse your mouth with warm salt water a few times a day. Mix 1/2 teaspoon (2.5 grams) salt with a cup (240 mL) of warm water.    When do I need to call the doctor?   Fever of 100.4°F (38°C) or higher  Signs of not getting enough water. These include dry mouth, very thirsty, or little or no urine.  Trouble swallowing or stiff neck or jaw  You are not feeling better in 2 to 3 days or you are feeling worse    Vaginal Yeast Infection  Diflucan 150 mg tablet once. If symptoms do not improve, can take second tablet 72 hours later.       Should you develop any worsening or new symptoms after leaving urgent care, it is recommended that you go to the ER for further/repeat evaluation.      Follow up with your PCP in 3-5 days after your urgent care visit.     Please remember that you have received care at an urgent care today. Urgent cares are not emergency rooms and are not equipped to handle life threatening emergencies and cannot rule in or out certain medical conditions and you may be released before all of your medical problems are known or treated, please schedule all follow up appointments as discussed and if you have worsening symptoms please go to the ER to rule out potential life threatening problems, as discussed.              "

## 2024-08-29 NOTE — PATIENT INSTRUCTIONS
"Thrush  Thrush is a type of yeast infection that you can get in your mouth. Germs, called fungi, cause yeast infections. These germs live almost everywhere on your body. Most often, your immune system can control the amount of yeast and you stay healthy. If you are sick, the yeast can multiply and cause an infection.  Thrush causes white patches inside your mouth. You may also have redness, bleeding, or soreness. In other parts of your body, yeast infections can cause itching, burning or cracking of the skin. People who are breastfeeding may develop thrush on their nipples. Babies who are  can also develop oral thrush.    Treatment  Nystatin swish and swallow  4 mLs 4 times daily until symptoms resolve.  "Magic Mouthwash" as needed for pain. Swish, gargle, and spit.    What care is needed at home?   For adults and children:  Brush your teeth and tongue at least two times each day with a soft toothbrush. Floss every night. Change your toothbrush as ordered.  If you have dentures or retainers, clean them well every night.  Do not use over-the-counter (OTC) mouthwashes. They can kill healthy bacteria, which you may need to help recover.  Rinse your mouth with warm salt water a few times a day. Mix 1/2 teaspoon (2.5 grams) salt with a cup (240 mL) of warm water.    When do I need to call the doctor?   Fever of 100.4°F (38°C) or higher  Signs of not getting enough water. These include dry mouth, very thirsty, or little or no urine.  Trouble swallowing or stiff neck or jaw  You are not feeling better in 2 to 3 days or you are feeling worse    Vaginal Yeast Infection  Diflucan 150 mg tablet once. If symptoms do not improve, can take second tablet 72 hours later.       Should you develop any worsening or new symptoms after leaving urgent care, it is recommended that you go to the ER for further/repeat evaluation.      Follow up with your PCP in 3-5 days after your urgent care visit.     Please remember that you " have received care at an urgent care today. Urgent cares are not emergency rooms and are not equipped to handle life threatening emergencies and cannot rule in or out certain medical conditions and you may be released before all of your medical problems are known or treated, please schedule all follow up appointments as discussed and if you have worsening symptoms please go to the ER to rule out potential life threatening problems, as discussed.

## 2024-09-04 ENCOUNTER — OFFICE VISIT (OUTPATIENT)
Dept: URGENT CARE | Facility: CLINIC | Age: 39
End: 2024-09-04
Payer: MEDICAID

## 2024-09-04 VITALS
HEART RATE: 80 BPM | TEMPERATURE: 97 F | HEIGHT: 67 IN | OXYGEN SATURATION: 98 % | BODY MASS INDEX: 39.24 KG/M2 | WEIGHT: 250 LBS | SYSTOLIC BLOOD PRESSURE: 128 MMHG | DIASTOLIC BLOOD PRESSURE: 70 MMHG | RESPIRATION RATE: 18 BRPM

## 2024-09-04 DIAGNOSIS — S86.912A KNEE STRAIN, LEFT, INITIAL ENCOUNTER: Primary | ICD-10-CM

## 2024-09-04 PROCEDURE — 99213 OFFICE O/P EST LOW 20 MIN: CPT | Mod: S$GLB,,,

## 2024-09-04 RX ORDER — PREDNISONE 20 MG/1
20 TABLET ORAL 2 TIMES DAILY
Qty: 10 TABLET | Refills: 0 | Status: SHIPPED | OUTPATIENT
Start: 2024-09-04 | End: 2024-09-09

## 2024-09-04 RX ORDER — IBUPROFEN 800 MG/1
800 TABLET ORAL 3 TIMES DAILY
Qty: 45 TABLET | Refills: 0 | Status: SHIPPED | OUTPATIENT
Start: 2024-09-04

## 2024-09-04 NOTE — PROGRESS NOTES
"Subjective:      Patient ID: Jenn Lance is a 39 y.o. female.    Vitals:  height is 5' 7" (1.702 m) and weight is 113.4 kg (250 lb). Her oral temperature is 97 °F (36.1 °C). Her blood pressure is 128/70 and her pulse is 80. Her respiration is 18 and oxygen saturation is 98%.     Chief Complaint: Fall and LT ankle,knee, thigh pain     39-year-old female presents to the clinic today with chief complaint of left knee, thigh and lower leg pain after slipping on the floor while moving some boxes and notes she twisted her leg, shortly after the pain started. She states she did feel a pop in her knee, now she is having associated swelling.  Symptoms started today around 10 am and have not improved. Denies any previous surgeries or injuries on affected area. Patient has taken ibuprofen 800 mg. She states she had some popping, locking, and giving out.  Pain is constant 8/10 sharp shooting and burning. Patient notes weight bearing and twisting certain directions makes it worse and rest improves symptoms. Denies any erythema, abrasions, ecchymosis, or deformities. Denies numbness or tingling. Denies radiation of pain.  Denies fever, body aches, chest pain, shortness of breath, abdominal pain, N/V/D, or rashes.      Fall  The accident occurred 6 to 12 hours ago. The fall occurred while walking. There was no blood loss. Pertinent negatives include no abdominal pain, fever, headaches, nausea or vomiting.       Constitution: Negative for activity change, chills and fever.   HENT:  Negative for ear pain and sore throat.    Neck: Negative for neck pain.   Cardiovascular:  Negative for chest pain.   Eyes:  Negative for eye pain.   Respiratory:  Negative for shortness of breath.    Gastrointestinal:  Negative for abdominal pain, nausea, vomiting and diarrhea.   Genitourinary:  Negative for dysuria.   Musculoskeletal:  Positive for joint pain and joint swelling. Negative for pain and muscle ache.   Skin:  Negative for rash and " erythema.   Allergic/Immunologic: Negative for environmental allergies and seasonal allergies.   Neurological:  Negative for dizziness and headaches.   Psychiatric/Behavioral:  Negative for nervous/anxious. The patient is not nervous/anxious.       Objective:     Physical Exam   Constitutional: She is oriented to person, place, and time. She appears well-developed.   HENT:   Head: Normocephalic and atraumatic. Head is without abrasion, without contusion and without laceration.   Ears:   Right Ear: External ear normal.   Left Ear: External ear normal.   Nose: Nose normal.   Mouth/Throat: Oropharynx is clear and moist and mucous membranes are normal.   Eyes: Conjunctivae, EOM and lids are normal. Pupils are equal, round, and reactive to light.   Neck: Trachea normal and phonation normal. Neck supple.   Pulmonary/Chest: Effort normal. No respiratory distress.   Musculoskeletal:      Left knee: She exhibits decreased range of motion, swelling and abnormal meniscus. She exhibits no effusion, no ecchymosis, no deformity, no laceration, no erythema, normal alignment, no LCL laxity, normal patellar mobility, no bony tenderness and no MCL laxity. Tenderness found. Medial joint line tenderness noted.     Instability Tests: left knee positive medial Albertina test and left knee positive lateral Albertina test    Instability Tests: anterior drawer test positive and posterior drawer test positive     Comments: Graded 3-4/5 left knee flexion/extension   Neurological: She is alert and oriented to person, place, and time.   Skin: Skin is warm, dry, intact and no rash. Capillary refill takes less than 2 seconds. not left kneeNo abrasion, No burn, No bruising, No erythema and No ecchymosis   Psychiatric: Her speech is normal and behavior is normal. Judgment and thought content normal.   Nursing note and vitals reviewed.      Assessment:     1. Knee strain, left, initial encounter        Plan:       Knee strain, left, initial  encounter  -     ibuprofen (ADVIL,MOTRIN) 800 MG tablet; Take 1 tablet (800 mg total) by mouth 3 (three) times daily.  Dispense: 45 tablet; Refill: 0  -     predniSONE (DELTASONE) 20 MG tablet; Take 1 tablet (20 mg total) by mouth 2 (two) times daily. for 5 days  Dispense: 10 tablet; Refill: 0  -     Ambulatory referral/consult to Orthopedics  -     KNEE BRACE FOR HOME USE        No x-ray in clinic. Referral to Orthopedics placed to fully rule out potential mensicus tear. Hinge knee brace provided for stabilization until further evaluated by Orthopedics. We had shared decision making for patient's treatment. We discussed side effects/alternatives/benefits/risk and patient would like to proceed with treatment plan. We also discussed other OTC treatment recommendations. Patient was counseled, explained with the test results meaning, expected course, and answered all of questions.  Follow up with PCP in the next 1-2 weeks as needed.  Gave patient strict ER/urgent care precautions in case symptoms worsen or if any new concerns arise.

## 2024-09-04 NOTE — LETTER
September 4, 2024      [facility] [address]  [place] [address]  Phone: [phone]  Fax: [phone]       Patient: Jenn Lance   YOB: 1985  Date of Visit: 09/04/2024    To Whom It May Concern:    Shine Lance  was at [facility] on 09/04/2024. The patient may return to work/school on 9/5/24 with no restrictions. If you have any questions or concerns, or if I can be of further assistance, please do not hesitate to contact me.    Sincerely,    Ghazal Stephen PA-C

## 2024-09-04 NOTE — PATIENT INSTRUCTIONS
Please drink plenty of fluids.  Please get plenty of rest.  Please return here or go to the Emergency Department for any concerns or worsening of condition.  Recommend using prednisone first and if not having good results then start ibuprofen.   Use knee brace when weight bearing.   If you were prescribed a narcotic medication, do not drive or operate heavy equipment or machinery while taking these medications.  CALL [phone] TO SCHEDULE APPOINTMENT WITH SPECIALIST IF YOU HAVE NOT RECEIVED A CALL BY EITHER TODAY OR BY TOMORROW. : ORTHO  If you were not prescribed an anti-inflammatory medication, and if you do not have any history of stomach/intestinal ulcers, or kidney disease, or are not taking a blood thinner such as Coumadin, Plavix, Pradaxa, Eloquis, or Xaralta for example, it is OK to take over the counter Ibuprofen or Advil or Motrin or Aleve as directed.  Do not take these medications on an empty stomach.  Rest, ice, compression and elevation to the affected joint or limb as needed.  Please follow up with your primary care doctor or specialist as needed.    If you  smoke, please stop smoking.

## 2024-09-04 NOTE — LETTER
September 4, 2024      [facility] [place]  [place] [address]  Phone: [phone]  Fax: [phone]       Patient: Jenn Lance   YOB: 1985  Date of Visit: 09/04/2024    To Whom It May Concern:    Shine Lance  was at [facility] on 09/04/2024. The patient may return to work/school on 9/5/24 with restrictions until further evaluated by Orthopedics. If you have any questions or concerns, or if I can be of further assistance, please do not hesitate to contact me.    Sincerely,    Ghazal Stephen PA-C